# Patient Record
Sex: FEMALE | Race: WHITE | NOT HISPANIC OR LATINO | Employment: OTHER | URBAN - METROPOLITAN AREA
[De-identification: names, ages, dates, MRNs, and addresses within clinical notes are randomized per-mention and may not be internally consistent; named-entity substitution may affect disease eponyms.]

---

## 2017-01-13 ENCOUNTER — TELEPHONE (OUTPATIENT)
Dept: FAMILY MEDICINE | Facility: CLINIC | Age: 67
End: 2017-01-13

## 2017-01-13 NOTE — TELEPHONE ENCOUNTER
1/13/2017    Call Regarding Preventive Health Screening Colonoscopy and Mammogram    Attempt 1    Message on voicemail     Comments:       Outreach   cnt

## 2017-01-24 NOTE — TELEPHONE ENCOUNTER
1/24/2017    Call Regarding Preventive Health Screening Colonoscopy and Mammogram    Attempt 2    Message Patient is aware of overdue screening and will call on own time for scheduling      Comments:         Outreach   Vickie Lopez

## 2017-02-27 DIAGNOSIS — Z12.31 ENCOUNTER FOR SCREENING MAMMOGRAM FOR BREAST CANCER: Primary | ICD-10-CM

## 2017-11-08 ENCOUNTER — OFFICE VISIT (OUTPATIENT)
Dept: FAMILY MEDICINE | Facility: CLINIC | Age: 67
End: 2017-11-08
Payer: COMMERCIAL

## 2017-11-08 VITALS
DIASTOLIC BLOOD PRESSURE: 84 MMHG | WEIGHT: 177.8 LBS | SYSTOLIC BLOOD PRESSURE: 132 MMHG | HEART RATE: 66 BPM | BODY MASS INDEX: 27.85 KG/M2

## 2017-11-08 DIAGNOSIS — E11.9 TYPE 2 DIABETES MELLITUS WITHOUT COMPLICATION, WITHOUT LONG-TERM CURRENT USE OF INSULIN (H): Primary | ICD-10-CM

## 2017-11-08 DIAGNOSIS — Z11.59 NEED FOR HEPATITIS C SCREENING TEST: ICD-10-CM

## 2017-11-08 DIAGNOSIS — R03.0 ELEVATED BLOOD PRESSURE READING WITHOUT DIAGNOSIS OF HYPERTENSION: ICD-10-CM

## 2017-11-08 LAB — HBA1C MFR BLD: 7.2 % (ref 4.3–6)

## 2017-11-08 PROCEDURE — 36415 COLL VENOUS BLD VENIPUNCTURE: CPT | Performed by: FAMILY MEDICINE

## 2017-11-08 PROCEDURE — 82043 UR ALBUMIN QUANTITATIVE: CPT | Performed by: FAMILY MEDICINE

## 2017-11-08 PROCEDURE — 80048 BASIC METABOLIC PNL TOTAL CA: CPT | Performed by: FAMILY MEDICINE

## 2017-11-08 PROCEDURE — 99207 C FOOT EXAM  NO CHARGE: CPT | Performed by: FAMILY MEDICINE

## 2017-11-08 PROCEDURE — 86803 HEPATITIS C AB TEST: CPT | Performed by: FAMILY MEDICINE

## 2017-11-08 PROCEDURE — 84443 ASSAY THYROID STIM HORMONE: CPT | Performed by: FAMILY MEDICINE

## 2017-11-08 PROCEDURE — 80061 LIPID PANEL: CPT | Performed by: FAMILY MEDICINE

## 2017-11-08 PROCEDURE — 99214 OFFICE O/P EST MOD 30 MIN: CPT | Performed by: FAMILY MEDICINE

## 2017-11-08 PROCEDURE — 83036 HEMOGLOBIN GLYCOSYLATED A1C: CPT | Performed by: FAMILY MEDICINE

## 2017-11-08 RX ORDER — LANCETS 26 GAUGE
1 EACH MISCELLANEOUS 3 TIMES DAILY
Qty: 300 EACH | Refills: 1 | Status: SHIPPED | OUTPATIENT
Start: 2017-11-08 | End: 2018-12-07

## 2017-11-08 RX ORDER — METFORMIN HCL 500 MG
500 TABLET, EXTENDED RELEASE 24 HR ORAL
Qty: 270 TABLET | Refills: 3 | Status: SHIPPED | OUTPATIENT
Start: 2017-11-08 | End: 2018-12-07

## 2017-11-08 NOTE — PROGRESS NOTES
SUBJECTIVE:   Sigrid Mcneill is a 67 year old female who presents to clinic today for the following health issues:    Chief Complaint   Patient presents with     Diabetes     recheck and medication refills      Diabetes Follow-up    Patient is checking blood sugars: rarely.  Results range from 100 to 170    Diabetic concerns: None     Symptoms of hypoglycemia (low blood sugar): none     Paresthesias (numbness or burning in feet) or sores: No     Date of last diabetic eye exam: never    Hyperlipidemia Follow-Up    Rate your low fat/cholesterol diet?: fair    Taking statin?  No    Other lipid medications/supplements?:  none    Hypertension Follow-up    Outpatient blood pressures are not being checked.    Low Salt Diet: low salt    Amount of exercise or physical activity: 4-5 days/week for an average of 15-30 minutes    Problems taking medications regularly: No    Medication side effects: none    Diet: regular (no restrictions)    ADDITIONAL HPI: 67 year old female here for above issue.     ROS: 10 point review of systems negative except as per HPI.    PAST MEDICAL HISTORY:  Past Medical History:   Diagnosis Date     Hypertension     resolved with weight loss        ACTIVE MEDICAL PROBLEMS:  Patient Active Problem List   Diagnosis     Esophageal reflux     CARDIOVASCULAR SCREENING; LDL GOAL LESS THAN 160     Advanced directives, counseling/discussion     Type 2 diabetes mellitus without complication (H)        FAMILY HISTORY:  Family History   Problem Relation Age of Onset     Hypertension Mother      Cardiovascular Mother      CHF     DIABETES Mother      Lung Cancer Father      smoker     CANCER Daughter      brain tumor     Thyroid Disease No family hx of        SOCIAL HISTORY:  Social History     Social History     Marital status:      Spouse name: N/A     Number of children: N/A     Years of education: N/A     Occupational History     Not on file.     Social History Main Topics     Smoking status:  Former Smoker     Smokeless tobacco: Never Used      Comment: quit 1978     Alcohol use No     Drug use: No     Sexual activity: Not Currently     Other Topics Concern     Parent/Sibling W/ Cabg, Mi Or Angioplasty Before 65f 55m? No     Social History Narrative       MEDICATIONS:  Current Outpatient Prescriptions   Medication Sig Dispense Refill     metFORMIN (GLUCOPHAGE-XR) 500 MG 24 hr tablet Take 1 tablet (500 mg) by mouth 3 times daily (before meals) 270 tablet 3     blood glucose monitoring (NO BRAND SPECIFIED) test strip Use to test blood sugars 2-3 times daily or as directed. ReliOn Prime Glucose test strips, what ever pt wants/Insurance will cover. 200 each 5     Blood Glucose Monitoring Suppl (RELION CONFIRM GLUCOSE MONITOR) W/DEVICE KIT 1 Device 3 times daily (before meals) 1 kit 1     RELION LANCETS THIN 26G MISC 1 each 3 times daily 300 each 1     Lactobacillus (ACIDOPHILUS PROBIOTIC) 100 MG CAPS Take 1 capsule by mouth daily       CRANBERRY 2 tablets daily as needed         omeprazole (PRILOSEC) 20 MG capsule Take 1 capsule by mouth daily. 90 capsule 3     CLARITIN 10 MG OR TABS 1 TABLET DAILY prn       STATIN NOT PRESCRIBED, INTENTIONAL, Statin not prescribed intentionally due to Other **We discussed the recommendation that diabetics be started on statins regardless of lipid levels, but Sigrid would prefer not to do that as long as she can keep her sugars well controlled (current A1C now 6.4), and in view of her baseline lipids (, HDL 45, Tg 116, ) I think that is reasonable.* (This option does not exclude patient from measure) (Patient not taking: Reported on 11/8/2017) 0 each 0     estradiol (VAGIFEM) 10 MCG TABS Insert one nightly in vagina for two weeks, then one twice weekly (Patient not taking: Reported on 11/8/2017) 24 tablet prn     Phenazopyridine HCl (AZO TABS PO) Take  by mouth. 2 tabs as needed         ALLERGIES:   No Known Allergies    Problem list, Medication list, Allergies,  and Medical/Social/Surgical histories reviewed in [facility] and updated as appropriate.    OBJECTIVE:                                                    VITALS: /90 (BP Location: Right arm, Patient Position: Chair, Cuff Size: Adult Regular)  Pulse 66  Wt 177 lb 12.8 oz (80.6 kg)  BMI 27.85 kg/m2 Body mass index is 27.85 kg/(m^2).  GENERAL: Pleasant, well appearing female.  HEENT: PERRL, EOMI, oropharynx clear.  NECK: supple, no thyromegaly or thyroid masses, no lymphadenopathy.  CV: RRR, no murmurs, rubs or gallops. No carotid bruits.   LUNGS: Clear to auscultation bilaterally, normal effort.  ABDOMEN: Soft, non-distended, non-tender.  No hepatosplenomegaly or palpable masses.    SKIN: warm and dry without obvious rashes.   EXTREMITIES: No edema, normal pulses. Diabetic foot exam: normal DP and PT pulses, no trophic changes or ulcerative lesions and normal sensory exam       Lab Results   Component Value Date    A1C 7.2 11/08/2017    A1C 6.4 06/13/2016    A1C 13.4 02/02/2016          ASSESSMENT/PLAN:                                                    1. Type 2 diabetes mellitus without complication, without long-term current use of insulin (H)  Overall has done a good job controlling blood sugars. She readily admits that she often forgets multiple doses of metformin per day. She's taking it 3 times a day primarily because of upset stomach. Advised her to try maybe streamlining it to 500 with breakfast and 1000 with lunch or dinner. Discussed that really given the fact that formulation she can dose it once a day as long as she tolerates it from a GI standpoint. She will try this. Hopefully with fewer missed doses she'll have improved sugar control. She has a very high deductible for visits. She is hoping that is 6 months from now she will do a lab only diabetes check and only follow up if there is issues. Future labs pended. If insurance changes and deductible is less advised an office visit in 6 months otherwise  "we'll see her in a year.  - TSH with free T4 reflex  - Hemoglobin A1c  - Basic metabolic panel  (Ca, Cl, CO2, Creat, Gluc, K, Na, BUN)  - Lipid panel reflex to direct LDL Fasting  - metFORMIN (GLUCOPHAGE-XR) 500 MG 24 hr tablet; Take 1 tablet (500 mg) by mouth 3 times daily (before meals)  Dispense: 270 tablet; Refill: 3  - blood glucose monitoring (NO BRAND SPECIFIED) test strip; Use to test blood sugars 2-3 times daily or as directed. ReliOn Prime Glucose test strips, what ever pt wants/Insurance will cover.  Dispense: 200 each; Refill: 5  - RELION LANCETS THIN 26G MISC; 1 each 3 times daily  Dispense: 300 each; Refill: 1  - aspirin 81 MG EC tablet; Take 1 tablet (81 mg) by mouth daily  Dispense: 90 tablet; Refill: 3  - Albumin Random Urine Quantitative with Creat Ratio  - FOOT EXAM    2. Need for hepatitis C screening test  - Hepatitis C Screen Reflex to HCV RNA Quant and Genotype    3. Elevated blood pressure reading without diagnosis of hypertension  Did come down on recheck. We'll keep an eye on this. Advised monitoring outpatient blood pressures. Discussed starting an ACE inhibitor. Recommended that that is standard of care for diabetes regardless of whether or not there is hypertension. She is disinclined to start that today. \"I don't want to take anything I don't absolutely have to\" it appears that she has not ever had urine microalbumin so we'll check that. If elevated would more strongly recommend ACE inhibitor. 6      Also discussed health maintenance. She is due for mammogram, colon cancer screening, DEXA scan, pneumococcal vaccine, eye exam, hep C screening. She is agreeable to having hep C tested. She does not want to do mammogram, colon cancer screening, bone density screening, pneumonia vaccine. We discussed clinical recommendations regarding this. I strongly encouraged her to consider getting the screening tests. Discussed rationale. Discussed when any women get breast cancer and the outcomes are " improved with early detection via mammography. Discussed frequency of colon cancer diagnosis and polyp removal and colonoscopy is essentially curative of colon cancer. Discussed fit testing as an alternative to colonoscopy for colon cancer screening. Recommended annual eye exam to screen for diabetic retinopathy. Discussed risk of blindness if not detected early. Discussed screening DEXA scan. CBC information provided on pneumococcal vaccine.

## 2017-11-08 NOTE — LETTER
[facility]  [address]  [facility] [address]  [phone]        August 16, 2018    Sigrid Mcneill  [address] [facility] [address]              Dear Sigrid Mcneill    This is to remind you that your A1C is due.    You may call our office at [phone] to schedule an appointment.    Please disregard this notice if you have already had your labs drawn or made an appointment.        Sincerely,        Idalia Gage MD

## 2017-11-08 NOTE — LETTER
[facility]  [address] [facility] [address]  Phone: [phone]      11/22/2017     Sigrid Mcneill  [address] [place] [address]      Dear Sigrid:    Thank you for allowing me to participate in your care. Your recent test results were reviewed and listed below.  Other than mildly increased A1C - labs are stable.     Your results are provided below for your review  Results for orders placed or performed in visit on 11/08/17   TSH with free T4 reflex   Result Value Ref Range    TSH 1.65 0.40 - 4.00 mU/L   Hemoglobin A1c   Result Value Ref Range    Hemoglobin A1C 7.2 (H) 4.3 - 6.0 %   Basic metabolic panel  (Ca, Cl, CO2, Creat, Gluc, K, Na, BUN)   Result Value Ref Range    Sodium 142 133 - 144 mmol/L    Potassium 3.8 3.4 - 5.3 mmol/L    Chloride 109 94 - 109 mmol/L    Carbon Dioxide 26 20 - 32 mmol/L    Anion Gap 7 3 - 14 mmol/L    Glucose 127 (H) 70 - 99 mg/dL    Urea Nitrogen 11 7 - 30 mg/dL    Creatinine 0.96 0.52 - 1.04 mg/dL    GFR Estimate 58 (L) >60 mL/min/1.7m2    GFR Estimate If Black 70 >60 mL/min/1.7m2    Calcium 9.3 8.5 - 10.1 mg/dL   Lipid panel reflex to direct LDL Fasting   Result Value Ref Range    Cholesterol 129 <200 mg/dL    Triglycerides 117 <150 mg/dL    HDL Cholesterol 37 (L) >49 mg/dL    LDL Cholesterol Calculated 69 <100 mg/dL    Non HDL Cholesterol 92 <130 mg/dL   Albumin Random Urine Quantitative with Creat Ratio   Result Value Ref Range    Creatinine Urine 106 mg/dL    Albumin Urine mg/L 17 mg/L    Albumin Urine mg/g Cr 16.32 0 - 25 mg/g Cr   Hepatitis C Screen Reflex to HCV RNA Quant and Genotype   Result Value Ref Range    Hepatitis C Antibody Nonreactive NR^Nonreactive                 Thank you for choosing [facility]. As a result, please continue with the treatment plan discussed in the office. Return as discussed or sooner if symptoms worsen or fail to improve. If you have any further questions or concerns, please do not hesitate to contact  us.      Sincerely,        Dr. Idalia Gage

## 2017-11-08 NOTE — NURSING NOTE
"Chief Complaint   Patient presents with     Diabetes     recheck and medication refills        Initial /90 (BP Location: Right arm, Patient Position: Chair, Cuff Size: Adult Regular)  Pulse 66  Wt 177 lb 12.8 oz (80.6 kg)  BMI 27.85 kg/m2 Estimated body mass index is 27.85 kg/(m^2) as calculated from the following:    Height as of 2/4/16: 5' 7\" (1.702 m).    Weight as of this encounter: 177 lb 12.8 oz (80.6 kg).  Medication Reconciliation: complete   Vanessa Yanez CMA    "

## 2017-11-08 NOTE — MR AVS SNAPSHOT
"              After Visit Summary   11/8/2017    Sigrid Mcneill    MRN: [id number]           Patient Information     Date Of Birth          1950        Visit Information        Provider Department      11/8/2017 3:00 PM Idalia Gage MD [facility]        Today's Diagnoses     Type 2 diabetes mellitus without complication, without long-term current use of insulin (H)    -  1    Elevated blood pressure reading without diagnosis of hypertension        Need for hepatitis C screening test           Follow-ups after your visit        Future tests that were ordered for you today     Open Future Orders        Priority Expected Expires Ordered    Hemoglobin A1c Routine 5/11/2018 8/10/2018 11/8/2017            Who to contact     If you have questions or need follow up information about today's clinic visit or your schedule please contact [facility] directly at [phone].  Normal or non-critical lab and imaging results will be communicated to you by [employer], letter or phone within 4 business days after the clinic has received the results. If you do not hear from us within 7 days, please contact the clinic through [employer] or phone. If you have a critical or abnormal lab result, we will notify you by phone as soon as possible.  Submit refill requests through [employer] or call your pharmacy and they will forward the refill request to us. Please allow 3 business days for your refill to be completed.          Additional Information About Your Visit        MyChart Information     [employer] lets you send messages to your doctor, view your test results, renew your prescriptions, schedule appointments and more. To sign up, go to [email or web address] . Click on \"Log in\" on the left side of the screen, which will take you to the Welcome page. Then click on \"Sign up Now\" on the right side of the page.     You will be asked to enter the access code listed below, as well as some " personal information. Please follow the directions to create your username and password.     Your access code is: [email or web address]  Expires: 2018  4:39 PM     Your access code will  in 90 days. If you need help or a new code, please call your [facility] clinic or [phone].        Care EveryWhere ID     This is your Care EveryWhere ID. This could be used by other organizations to access your [facility] medical records  [id number]        Your Vitals Were     Pulse BMI (Body Mass Index)                66 27.85 kg/m2           Blood Pressure from Last 3 Encounters:   17 132/84   16 124/84   16 (!) 129/92    Weight from Last 3 Encounters:   17 177 lb 12.8 oz (80.6 kg)   16 167 lb (75.8 kg)   16 167 lb (75.8 kg)              We Performed the Following     Albumin Random Urine Quantitative with Creat Ratio     Basic metabolic panel  (Ca, Cl, CO2, Creat, Gluc, K, Na, BUN)     FOOT EXAM     Hemoglobin A1c     Hepatitis C Screen Reflex to HCV RNA Quant and Genotype     Lipid panel reflex to direct LDL Fasting     TSH with free T4 reflex          Today's Medication Changes          These changes are accurate as of: 17  4:39 PM.  If you have any questions, ask your nurse or doctor.               Start taking these medicines.        Dose/Directions    blood glucose monitoring test strip   Commonly known as:  no brand specified   Used for:  Type 2 diabetes mellitus without complication, without long-term current use of insulin (H)   Started by:  Idalia Gage MD        Use to test blood sugars 2-3 times daily or as directed. ReliOn Prime Glucose test strips, what ever pt wants/Insurance will cover.   Quantity:  200 each   Refills:  5         Stop taking these medicines if you haven't already. Please contact your care team if you have questions.     estradiol 10 MCG Tabs vaginal tablet   Commonly known as:  VAGIFEM   Stopped by:  Idalia Gage MD                 Where to get your medicines      These medications [place] [facility] [place] - [address] [facility]  [address] [facility] [place], [facility] [address]     Phone:  [phone]     metFORMIN 500 MG 24 hr tablet         These medications were sent to [facility] - [place], [place] - [address]  [address], [place] [address]     Phone:  [phone]     RELION LANCETS THIN 26G Misc         These medications were sent to [facility] [address] [place], [place] - [address]  [address], [facility] [address]     Phone:  [phone]     blood glucose monitoring test strip                Primary Care Provider Office Phone # Fax #    Devorah Hannah Prince [phone] [address]  [facility] [address]        Equal Access to Services     [facility] : Hadii aad ku hadasho Soomaali, waaxda luqadaha, qaybta kaalmada adeegyada, waxay idiin hayaan adeeg kharash la'manuel . So [facility] [phone].    ATENCIÓN: Si habla español, tiene a fermin disposición servicios gratuitos de asistencia lingüística. Ll[facility] [phone].    We comply with applicable federal civil rights laws and [place] laws. We do not discriminate on the basis of race, color, national origin, age, disability, sex, sexual orientation, or gender identity.            Thank you!     Thank you for choosing [facility]  for your care. Our goal is always to provide you with excellent care. Hearing back from our patients is one way we can continue to improve our services. Please take a few minutes to complete the written survey that you may receive in the mail after your visit with us. Thank you!             Your Updated Medication List - Protect others around you: Learn how to safely use, store and throw away your medicines at www.disposemymeds.org.          This list is accurate as of: 11/8/17  4:39 PM.  Always use your most  recent med list.                   Brand Name Dispense Instructions for use Diagnosis    ACIDOPHILUS PROBIOTIC 100 MG Caps      Take 1 capsule by mouth daily        aspirin 81 MG EC tablet     90 tablet    Take 1 tablet (81 mg) by mouth daily    Type 2 diabetes mellitus without complication, without long-term current use of insulin (H)       AZO TABS PO      Take  by mouth. 2 tabs as needed        blood glucose monitoring test strip    no brand specified    200 each    Use to test blood sugars 2-3 times daily or as directed. ReliOn Prime Glucose test strips, what ever pt wants/Insurance will cover.    Type 2 diabetes mellitus without complication, without long-term current use of insulin (H)       CLARITIN 10 MG tablet   Generic drug:  loratadine      1 TABLET DAILY prn        CRANBERRY      2 tablets daily as needed        metFORMIN 500 MG 24 hr tablet    GLUCOPHAGE-XR    270 tablet    Take 1 tablet (500 mg) by mouth 3 times daily (before meals)    Type 2 diabetes mellitus without complication, without long-term current use of insulin (H)       omeprazole 20 MG CR capsule    priLOSEC    90 capsule    Take 1 capsule by mouth daily.    Esophageal reflux       RELION CONFIRM GLUCOSE MONITOR W/DEVICE Kit     1 kit    1 Device 3 times daily (before meals)    Type 2 diabetes mellitus, uncontrolled (H)       RELION LANCETS THIN 26G Misc     300 each    1 each 3 times daily    Type 2 diabetes mellitus without complication, without long-term current use of insulin (H)       STATIN NOT PRESCRIBED (INTENTIONAL)     0 each    Statin not prescribed intentionally due to Other **We discussed the recommendation that diabetics be started on statins regardless of lipid levels, but Sigrid would prefer not to do that as long as she can keep her sugars well controlled (current A1C now 6.4), and in view of her baseline lipids (, HDL 45, Tg 116, ) I think that is reasonable.* (This option does not exclude patient from  measure)    Type 2 diabetes mellitus without complication (H)

## 2017-11-09 LAB
ANION GAP SERPL CALCULATED.3IONS-SCNC: 7 MMOL/L (ref 3–14)
BUN SERPL-MCNC: 11 MG/DL (ref 7–30)
CALCIUM SERPL-MCNC: 9.3 MG/DL (ref 8.5–10.1)
CHLORIDE SERPL-SCNC: 109 MMOL/L (ref 94–109)
CHOLEST SERPL-MCNC: 129 MG/DL
CO2 SERPL-SCNC: 26 MMOL/L (ref 20–32)
CREAT SERPL-MCNC: 0.96 MG/DL (ref 0.52–1.04)
CREAT UR-MCNC: 106 MG/DL
GFR SERPL CREATININE-BSD FRML MDRD: 58 ML/MIN/1.7M2
GLUCOSE SERPL-MCNC: 127 MG/DL (ref 70–99)
HCV AB SERPL QL IA: NONREACTIVE
HDLC SERPL-MCNC: 37 MG/DL
LDLC SERPL CALC-MCNC: 69 MG/DL
MICROALBUMIN UR-MCNC: 17 MG/L
MICROALBUMIN/CREAT UR: 16.32 MG/G CR (ref 0–25)
NONHDLC SERPL-MCNC: 92 MG/DL
POTASSIUM SERPL-SCNC: 3.8 MMOL/L (ref 3.4–5.3)
SODIUM SERPL-SCNC: 142 MMOL/L (ref 133–144)
TRIGL SERPL-MCNC: 117 MG/DL
TSH SERPL DL<=0.005 MIU/L-ACNC: 1.65 MU/L (ref 0.4–4)

## 2017-12-20 ENCOUNTER — TELEPHONE (OUTPATIENT)
Dept: FAMILY MEDICINE | Facility: CLINIC | Age: 67
End: 2017-12-20

## 2017-12-20 NOTE — LETTER
[facility]  [address]  [facility] [address]  Phone: [phone]    December 20, 2017      Sigrid Mcneill  [address] [place] [place] [address]      Dear Sigrid:    As part of [facility]'s commitment to health and wellness, we inform our patient when records indicate the need for specific health screening.  Please review the following health screening recommendations based on your age:    This is a reminder that it is time to make your appointment for your annual mammogram.  You may make an appointment for your Digital Mammogram by calling our scheduling line at [phone] to schedule at one of our locations. Your last mammogram we have on file was 3/30/05.    If you are experiencing breast symptoms or concerns such as a new lump or breast pain you should first contact your health care provider before scheduling your mammogram. Your physician may want to see you prior to your visit.    We would like to take this opportunity to remind you that along with an annual mammogram, the American Cancer Society recommends an annual breast examination by your physician or health care provider.    If you are a patient currently enrolled in the [place] JALEN Program or the [facility] Wellness Program, you must be seen by your provider for a clinical breast examination prior to your mammogram.    Please disregard this notice if you have already scheduled an appointment. Or, if you have had this done elsewhere, please have your records sent to the clinic.     Thank you and we look forward to seeing you in the near future for your annual screening mammogram.      Sincerely,     Idalia Gage MD/la

## 2017-12-20 NOTE — TELEPHONE ENCOUNTER
Panel Management Review      Patient has the following on her problem list:     Diabetes    ASA: Passed    Last A1C  Lab Results   Component Value Date    A1C 7.2 11/08/2017    A1C 6.4 06/13/2016    A1C 13.4 02/02/2016     A1C tested: MONITOR    Last LDL:    Lab Results   Component Value Date    CHOL 129 11/08/2017     Lab Results   Component Value Date    HDL 37 11/08/2017     Lab Results   Component Value Date    LDL 69 11/08/2017     Lab Results   Component Value Date    TRIG 117 11/08/2017     No results found for: CHOLHDLRATIO  Lab Results   Component Value Date    NHDL 92 11/08/2017       Is the patient on a Statin? NO             Is the patient on Aspirin? YES    Medications     HMG CoA Reductase Inhibitors    STATIN NOT PRESCRIBED, INTENTIONAL,    Salicylates    aspirin 81 MG EC tablet          Last three blood pressure readings:  BP Readings from Last 3 Encounters:   11/08/17 132/84   02/04/16 124/84   02/02/16 (!) 129/92       Date of last diabetes office visit: 11/8/17     Tobacco History:     History   Smoking Status     Former Smoker   Smokeless Tobacco     Never Used     Comment: quit 1978             Composite cancer screening  Chart review shows that this patient is due/due soon for the following Mammogram and Fecal Colorectal (FIT)  Summary:    Patient is due/failing the following:   FIT and MAMMOGRAM    Action needed:   Due to complete MAMMOGRAM and FIT    Type of outreach:    Sent letter.    Questions for provider review:    None                                                                                                                                    Janet Sullivan MA

## 2017-12-20 NOTE — LETTER
[facility]  [address]  [facility] [address]  Phone: [phone]    December 20, 2017      Sigrid Mcneill  [address] [place] [address]      Dear Sigrid:      To better serve you, we are sending this letter to notify you that we have attempted to contact you by telephone to schedule the following procedure(s) ordered by your physician.  In review of our electronic medical record, we believe that your colon cancer screening is not up to date. Routine screening is recommended for most patients every ten years between the ages of 50 and 80. Currently, most physicians recommend colonoscopy. If you have already had this procedure at another facility, we would like to have you get a copy of the report to us. If you would like more information about colonoscopy, and you have access to the internet, please visit the [facility] web site below.   If you are ready to schedule your colonoscopy, please call [phone] to schedule your appointment. The instructions for the prep are included in this letter.     [email or web address]                                                                     OR    We now offer a less invasive form of colon cancer screening that can be completed yearly. It is called a FIT test or Fecal Immunoassay Test. This test can be done at home and mailed in.    This is a test to check for blood in the stool. Studies have proven that this test, when performed yearly in people ages 50 to 75, reduces the number of deaths due to Colorectal cancer by as much as 30 %.    If this is a test you are willing to complete, to help you with your preventative self care, please contact your clinic for the appropriate kit. They will also provide you  with instructions and answer any other questions you may have.                  It is possible that you are seeing someone else for your primary health care.  If you have had  either of these tests done elsewhere, let us know where it was done, approximately when, and the results or have your records sent to the clinic. We will update your chart and this will eliminate sending you reminder letters and phone calls.    Sincerely,    Idalia Gage MD/la

## 2018-03-14 ENCOUNTER — TELEPHONE (OUTPATIENT)
Dept: FAMILY MEDICINE | Facility: CLINIC | Age: 68
End: 2018-03-14

## 2018-03-14 NOTE — TELEPHONE ENCOUNTER
The Patient declined Preventive Health Screens for: Mammogram  Please review chart and follow-up with patient if needed.    Thank you

## 2018-12-07 ENCOUNTER — OFFICE VISIT (OUTPATIENT)
Dept: FAMILY MEDICINE | Facility: CLINIC | Age: 68
End: 2018-12-07
Payer: COMMERCIAL

## 2018-12-07 VITALS
DIASTOLIC BLOOD PRESSURE: 82 MMHG | RESPIRATION RATE: 16 BRPM | WEIGHT: 178.4 LBS | OXYGEN SATURATION: 96 % | TEMPERATURE: 97 F | BODY MASS INDEX: 28 KG/M2 | HEART RATE: 79 BPM | SYSTOLIC BLOOD PRESSURE: 132 MMHG | HEIGHT: 67 IN

## 2018-12-07 DIAGNOSIS — E11.9 TYPE 2 DIABETES MELLITUS WITHOUT COMPLICATION, WITHOUT LONG-TERM CURRENT USE OF INSULIN (H): Primary | ICD-10-CM

## 2018-12-07 DIAGNOSIS — R74.01 NONSPECIFIC ELEVATION OF LEVELS OF TRANSAMINASE OR LACTIC ACID DEHYDROGENASE (LDH): ICD-10-CM

## 2018-12-07 DIAGNOSIS — R03.0 ELEVATED BLOOD PRESSURE READING WITHOUT DIAGNOSIS OF HYPERTENSION: ICD-10-CM

## 2018-12-07 DIAGNOSIS — R74.02 NONSPECIFIC ELEVATION OF LEVELS OF TRANSAMINASE OR LACTIC ACID DEHYDROGENASE (LDH): ICD-10-CM

## 2018-12-07 LAB
ALBUMIN SERPL-MCNC: 4.1 G/DL (ref 3.4–5)
ALP SERPL-CCNC: 118 U/L (ref 40–150)
ALT SERPL W P-5'-P-CCNC: 60 U/L (ref 0–50)
ANION GAP SERPL CALCULATED.3IONS-SCNC: 4 MMOL/L (ref 3–14)
AST SERPL W P-5'-P-CCNC: 58 U/L (ref 0–45)
BILIRUB SERPL-MCNC: 0.4 MG/DL (ref 0.2–1.3)
BUN SERPL-MCNC: 11 MG/DL (ref 7–30)
CALCIUM SERPL-MCNC: 8.5 MG/DL (ref 8.5–10.1)
CHLORIDE SERPL-SCNC: 106 MMOL/L (ref 94–109)
CHOLEST SERPL-MCNC: 153 MG/DL
CO2 SERPL-SCNC: 30 MMOL/L (ref 20–32)
CREAT SERPL-MCNC: 0.82 MG/DL (ref 0.52–1.04)
CREAT UR-MCNC: 80 MG/DL
GFR SERPL CREATININE-BSD FRML MDRD: 69 ML/MIN/1.7M2
GLUCOSE SERPL-MCNC: 101 MG/DL (ref 70–99)
HBA1C MFR BLD: 7.1 % (ref 0–5.6)
HDLC SERPL-MCNC: 37 MG/DL
LDLC SERPL CALC-MCNC: 90 MG/DL
MICROALBUMIN UR-MCNC: 19 MG/L
MICROALBUMIN/CREAT UR: 24.06 MG/G CR (ref 0–25)
NONHDLC SERPL-MCNC: 116 MG/DL
POTASSIUM SERPL-SCNC: 4.1 MMOL/L (ref 3.4–5.3)
PROT SERPL-MCNC: 7.2 G/DL (ref 6.8–8.8)
SODIUM SERPL-SCNC: 140 MMOL/L (ref 133–144)
TRIGL SERPL-MCNC: 130 MG/DL
TSH SERPL DL<=0.005 MIU/L-ACNC: 2.54 MU/L (ref 0.4–4)

## 2018-12-07 PROCEDURE — 99214 OFFICE O/P EST MOD 30 MIN: CPT | Performed by: FAMILY MEDICINE

## 2018-12-07 PROCEDURE — 80061 LIPID PANEL: CPT | Performed by: FAMILY MEDICINE

## 2018-12-07 PROCEDURE — 82043 UR ALBUMIN QUANTITATIVE: CPT | Performed by: FAMILY MEDICINE

## 2018-12-07 PROCEDURE — 83036 HEMOGLOBIN GLYCOSYLATED A1C: CPT | Performed by: FAMILY MEDICINE

## 2018-12-07 PROCEDURE — 80053 COMPREHEN METABOLIC PANEL: CPT | Performed by: FAMILY MEDICINE

## 2018-12-07 PROCEDURE — 36415 COLL VENOUS BLD VENIPUNCTURE: CPT | Performed by: FAMILY MEDICINE

## 2018-12-07 PROCEDURE — 84443 ASSAY THYROID STIM HORMONE: CPT | Performed by: FAMILY MEDICINE

## 2018-12-07 RX ORDER — LANCETS 26 GAUGE
1 EACH MISCELLANEOUS 3 TIMES DAILY
Qty: 300 EACH | Refills: 1 | Status: SHIPPED | OUTPATIENT
Start: 2018-12-07 | End: 2024-09-13

## 2018-12-07 RX ORDER — METFORMIN HCL 500 MG
1500 TABLET, EXTENDED RELEASE 24 HR ORAL
Qty: 270 TABLET | Refills: 3 | Status: SHIPPED | OUTPATIENT
Start: 2018-12-07 | End: 2020-01-10

## 2018-12-07 RX ORDER — LANCETS 26 GAUGE
1 EACH MISCELLANEOUS 3 TIMES DAILY
Qty: 300 EACH | Refills: 1 | Status: SHIPPED | OUTPATIENT
Start: 2018-12-07 | End: 2018-12-07

## 2018-12-07 NOTE — PROGRESS NOTES
SUBJECTIVE:   Sigrid Mcneill is a 68 year old female who presents to clinic today for the following health issues:      Diabetes Follow-up      Patient is checking blood sugars: 1 times a month    Diabetic concerns: None     Symptoms of hypoglycemia (low blood sugar): none     Paresthesias (numbness or burning in feet) or sores: No     Date of last diabetic eye exam: 2001    BP Readings from Last 2 Encounters:   12/07/18 132/82   11/08/17 132/84     Hemoglobin A1C (%)   Date Value   11/08/2017 7.2 (H)   06/13/2016 6.4 (H)     LDL Cholesterol Calculated (mg/dL)   Date Value   11/08/2017 69   02/02/2016 103 (H)       Diabetes Management Resources    Amount of exercise or physical activity: 6-7 days/week for an average of greater than 60 minutes    Problems taking medications regularly: No    Medication side effects: none    Diet: regular (no restrictions)    ADDITIONAL HPI: 68 year old female here for above issue.      ROS: 10 point review of systems negative except as per HPI.    PAST MEDICAL HISTORY:  Past Medical History:   Diagnosis Date     Hypertension     resolved with weight loss        ACTIVE MEDICAL PROBLEMS:  Patient Active Problem List   Diagnosis     Esophageal reflux     CARDIOVASCULAR SCREENING; LDL GOAL LESS THAN 160     Advanced directives, counseling/discussion     Type 2 diabetes mellitus without complication (H)     Elevated blood pressure reading without diagnosis of hypertension        FAMILY HISTORY:  Family History   Problem Relation Age of Onset     Hypertension Mother      Cardiovascular Mother      CHF     Diabetes Mother      Lung Cancer Father      smoker     Cancer Daughter      brain tumor     Thyroid Disease No family hx of        SOCIAL HISTORY:  Social History     Social History     Marital status:      Spouse name: N/A     Number of children: N/A     Years of education: N/A     Occupational History     Not on file.     Social History Main Topics     Smoking status:  Former Smoker     Smokeless tobacco: Never Used      Comment: quit 1978     Alcohol use No     Drug use: No     Sexual activity: Not Currently     Other Topics Concern     Parent/Sibling W/ Cabg, Mi Or Angioplasty Before 65f 55m? No     Social History Narrative       MEDICATIONS:  Current Outpatient Prescriptions   Medication Sig Dispense Refill     CLARITIN 10 MG OR TABS 1 TABLET DAILY prn       metFORMIN (GLUCOPHAGE-XR) 500 MG 24 hr tablet Take 1 tablet (500 mg) by mouth 3 times daily (before meals) 270 tablet 3     omeprazole (PRILOSEC) 20 MG capsule Take 1 capsule by mouth daily. 90 capsule 3     aspirin 81 MG EC tablet Take 1 tablet (81 mg) by mouth daily 90 tablet 3     blood glucose monitoring (NO BRAND SPECIFIED) test strip Use to test blood sugars 2-3 times daily or as directed. ReliOn Prime Glucose test strips, what ever pt wants/Insurance will cover. 200 each 5     Blood Glucose Monitoring Suppl (RELION CONFIRM GLUCOSE MONITOR) W/DEVICE KIT 1 Device 3 times daily (before meals) 1 kit 1     CRANBERRY 2 tablets daily as needed         Lactobacillus (ACIDOPHILUS PROBIOTIC) 100 MG CAPS Take 1 capsule by mouth daily       Phenazopyridine HCl (AZO TABS PO) Take  by mouth. 2 tabs as needed       RELION LANCETS THIN 26G MISC 1 each 3 times daily 300 each 1     STATIN NOT PRESCRIBED, INTENTIONAL, Statin not prescribed intentionally due to Other **We discussed the recommendation that diabetics be started on statins regardless of lipid levels, but Sigrid would prefer not to do that as long as she can keep her sugars well controlled (current A1C now 6.4), and in view of her baseline lipids (, HDL 45, Tg 116, ) I think that is reasonable.* (This option does not exclude patient from measure) (Patient not taking: Reported on 11/8/2017) 0 each 0       ALLERGIES:   No Known Allergies    Problem list, Medication list, Allergies, and Medical/Social/Surgical histories reviewed in EPIC and updated as  "appropriate.    OBJECTIVE:                                                    VITALS: /82 (Cuff Size: Adult Regular)  Pulse 79  Temp 97  F (36.1  C) (Tympanic)  Resp 16  Ht 5' 7\" (1.702 m)  Wt 178 lb 6.4 oz (80.9 kg)  SpO2 96%  BMI 27.94 kg/m2 Body mass index is 27.94 kg/(m^2).  GENERAL: Pleasant, well appearing female.  HEENT: PERRL, EOMI, oropharynx clear.  NECK: supple, no thyromegaly or thyroid masses, no lymphadenopathy.  CV: RRR, no murmurs, rubs or gallops.  LUNGS: Clear to auscultation bilaterally, normal effort.  ABDOMEN: Soft, non-distended, non-tender.  No hepatosplenomegaly or palpable masses.    SKIN: warm and dry without obvious rashes.   EXTREMITIES: No edema, normal pulses.     Lab Results   Component Value Date    A1C 7.2 11/08/2017    A1C 6.4 06/13/2016    A1C 13.4 02/02/2016          ASSESSMENT/PLAN:                                                    1. Type 2 diabetes mellitus without complication, without long-term current use of insulin (H)  Well controlled. Refilled medication. Check labs.    - Hemoglobin A1c  - Lipid panel reflex to direct LDL Fasting  - Comprehensive metabolic panel  - TSH with free T4 reflex  - Albumin Random Urine Quantitative with Creat Ratio  - metFORMIN (GLUCOPHAGE-XR) 500 MG 24 hr tablet; Take 3 tablets (1,500 mg) by mouth daily (with dinner)  Dispense: 270 tablet; Refill: 3  - blood glucose monitoring (NO BRAND SPECIFIED) test strip; Use to test blood sugars 2-3 times daily or as directed. ReliOn Prime Glucose test strips, what ever pt wants/Insurance will cover.  Dispense: 200 each; Refill: 5  - RELION LANCETS THIN 26G MISC; 1 each 3 times daily  Dispense: 300 each; Refill: 1    2. Elevated blood pressure reading without diagnosis of hypertension  Continue to monitor. She is reluctant to start medications.         "

## 2018-12-07 NOTE — MR AVS SNAPSHOT
After Visit Summary   12/7/2018    Sigrid Mcneill    MRN: [id number]           Patient Information     Date Of Birth          1950        Visit Information        Provider Department      12/7/2018 2:00 PM Idalia Gage MD [facility]        Today's Diagnoses     Type 2 diabetes mellitus without complication, without long-term current use of insulin (H)    -  1    Elevated blood pressure reading without diagnosis of hypertension          Care Instructions          Thank you for choosing [facility].  You may be receiving a survey in the mail from [facility] regarding your visit today.  Please take a few minutes to complete and return the survey to let us know how we are doing.      Our Clinic hours are:  Mondays    7:20 am - 7 pm  Tues -  Fri  7:20 am - 5 pm    Clinic Phone: [phone]    The clinic lab opens at 7:30 am Mon - Fri and appointments are required.    [facility]  Ph. [phone]  Monday  8 am - 7pm  Tues - Fri 8 am - 5:30 pm                 Follow-ups after your visit        Who to contact     If you have questions or need follow up information about today's clinic visit or your schedule please contact [facility] directly at [phone].  Normal or non-critical lab and imaging results will be communicated to you by MyChart, letter or phone within 4 business days after the clinic has received the results. If you do not hear from us within 7 days, please contact the clinic through MyChart or phone. If you have a critical or abnormal lab result, we will notify you by phone as soon as possible.  Submit refill requests through [facility] or call your pharmacy and they will forward the refill request to us. Please allow 3 business days for your refill to be completed.          Additional Information About Your Visit        Care EveryWhere ID     This is your Care EveryWhere ID. This could be used by other  "organizations to access your [facility] medical records  [id number]        Your Vitals Were     Pulse Temperature Respirations Height Pulse Oximetry BMI (Body Mass Index)    79 97  F (36.1  C) (Tympanic) 16 5' 7\" (1.702 m) 96% 27.94 kg/m2       Blood Pressure from Last 3 Encounters:   12/07/18 132/82   11/08/17 132/84   02/04/16 124/84    Weight from Last 3 Encounters:   12/07/18 178 lb 6.4 oz (80.9 kg)   11/08/17 177 lb 12.8 oz (80.6 kg)   02/04/16 167 lb (75.8 kg)              We Performed the Following     Albumin Random Urine Quantitative with Creat Ratio     Comprehensive metabolic panel     Hemoglobin A1c     Lipid panel reflex to direct LDL Fasting     TSH with free T4 reflex          Today's Medication Changes          These changes are accurate as of 12/7/18  2:52 PM.  If you have any questions, ask your nurse or doctor.               These medicines have changed or have updated prescriptions.        Dose/Directions    metFORMIN 500 MG 24 hr tablet   Commonly known as:  GLUCOPHAGE-XR   This may have changed:    - how much to take  - when to take this   Used for:  Type 2 diabetes mellitus without complication, without long-term current use of insulin (H)   Changed by:  Idalia Gage MD        Dose:  1500 mg   Take 3 tablets (1,500 mg) by mouth daily (with dinner)   Quantity:  270 tablet   Refills:  3            Where to get your medicines      These medications were [facility] [place] [place] [facility] [address] [facility]  [address] [facility] [address]     Phone:  [phone]     blood glucose monitoring test strip    metFORMIN 500 MG 24 hr tablet    RELION LANCETS THIN 26G [facility]                Primary Care Provider Office Phone # Fax #    Idalia Gage [phone] [address]  [facility] [address]        Equal Access to Services     JOSE ELIAS JULES AH: Hadii padmini kaur hadasho Soomaali, waaxda luqadaha, qaybta kaalmada " sujit loomisnohemy solisaacherelle ah. [facility] [phone].    ATENCIÓN: Si horaciola julee, tiene a fermin disposición servicios gratuitos de asistencia lingüística. Doe al [phone].    We comply with applicable federal civil rights laws and [place] laws. We do not discriminate on the basis of race, color, national origin, age, disability, sex, sexual orientation, or gender identity.            Thank you!     Thank you for choosing [facility]  for your care. Our goal is always to provide you with excellent care. Hearing back from our patients is one way we can continue to improve our services. Please take a few minutes to complete the written survey that you may receive in the mail after your visit with us. Thank you!             Your Updated Medication List - Protect others around you: Learn how to safely use, store and throw away your medicines at www.disposemymeds.org.          This list is accurate as of 12/7/18  2:52 PM.  Always use your most recent med list.                   Brand Name Dispense Instructions for use Diagnosis    ACIDOPHILUS PROBIOTIC 100 MG Caps      Take 1 capsule by mouth daily        aspirin 81 MG EC tablet    ASA    90 tablet    Take 1 tablet (81 mg) by mouth daily    Type 2 diabetes mellitus without complication, without long-term current use of insulin (H)       AZO TABS PO      Take  by mouth. 2 tabs as needed        blood glucose monitoring test strip    NO BRAND SPECIFIED    200 each    Use to test blood sugars 2-3 times daily or as directed. ReliOn Prime Glucose test strips, what ever pt wants/Insurance will cover.    Type 2 diabetes mellitus without complication, without long-term current use of insulin (H)       CLARITIN 10 MG tablet   Generic drug:  loratadine      1 TABLET DAILY prn        CRANBERRY      2 tablets daily as needed        metFORMIN 500 MG 24 hr tablet    GLUCOPHAGE-XR    270 tablet    Take 3 tablets (1,500 mg) by mouth daily  (with dinner)    Type 2 diabetes mellitus without complication, without long-term current use of insulin (H)       omeprazole 20 MG DR capsule    priLOSEC    90 capsule    Take 1 capsule by mouth daily.    Esophageal reflux       RELION CONFIRM GLUCOSE MONITOR w/Device Kit     1 kit    1 Device 3 times daily (before meals)    Type 2 diabetes mellitus, uncontrolled (H)       RELION LANCETS THIN 26G Misc     300 each    1 each 3 times daily    Type 2 diabetes mellitus without complication, without long-term current use of insulin (H)       STATIN NOT PRESCRIBED    INTENTIONAL    0 each    Statin not prescribed intentionally due to Other **We discussed the recommendation that diabetics be started on statins regardless of lipid levels, but Sigrid would prefer not to do that as long as she can keep her sugars well controlled (current A1C now 6.4), and in view of her baseline lipids (, HDL 45, Tg 116, ) I think that is reasonable.* (This option does not exclude patient from measure)    Type 2 diabetes mellitus without complication (H)

## 2018-12-07 NOTE — PATIENT INSTRUCTIONS
Thank you for choosing [facility].  You may be receiving a survey in the mail from [facility] regarding your visit today.  Please take a few minutes to complete and return the survey to let us know how we are doing.      Our Clinic hours are:  Mondays    7:20 am - 7 pm  Tues - Fri  7:20 am - 5 pm    Clinic Phone: [phone]    The clinic lab opens at 7:30 am Mon - Fri and appointments are required.    [facility]. [phone]  Monday  8 am - 7pm  Tues - Fri 8 am - 5:30 pm

## 2018-12-08 NOTE — PROGRESS NOTES
Please call the patient with the results. Notify that liver enzymes mildly elevated. In diabetics this may be a sign of fatty liver. I would recommend liver U/S for further evaluation and management. Order placed. If U/S normal then I'd plan to re-check labs again in 3 months to monitor this.

## 2018-12-12 ENCOUNTER — HOSPITAL ENCOUNTER (OUTPATIENT)
Dept: ULTRASOUND IMAGING | Facility: CLINIC | Age: 68
Discharge: HOME | End: 2018-12-12
Attending: FAMILY MEDICINE | Admitting: FAMILY MEDICINE
Payer: COMMERCIAL

## 2018-12-12 DIAGNOSIS — R74.01 NONSPECIFIC ELEVATION OF LEVELS OF TRANSAMINASE OR LACTIC ACID DEHYDROGENASE (LDH): ICD-10-CM

## 2018-12-12 DIAGNOSIS — R74.02 NONSPECIFIC ELEVATION OF LEVELS OF TRANSAMINASE OR LACTIC ACID DEHYDROGENASE (LDH): ICD-10-CM

## 2018-12-12 PROCEDURE — 76705 ECHO EXAM OF ABDOMEN: CPT

## 2018-12-12 NOTE — LETTER
[facility]  [address]  [place] [address]  Phone: [phone]      12/12/2018     Sigrid Mcneill  [address]  [place] [address]      Dear Sigrid:    Thank you for allowing me to participate in your care. Your recent test results were reviewed and listed below.  Ultrasound shows fatty liver, as expected. I am sending additional information regard this condition. The handout primarily discusses ACOSTA which is a more advanced for of NAFLD but all of the same preventive measures are helpful for NALFD. Limit or eliminate alcohol intake and reduce dietary carbohydrate intake.  I would plan to repeat labs annually and ultrasound every few years initially to monitor this.     Your results are provided below for your review  Results for orders placed or performed during the hospital encounter of 12/12/18   US Abdomen Limited    Narrative    RIGHT UPPER QUADRANT ULTRASOUND    PROCEDURE DATE:  12/12/2018 8:22 AM     CLINICAL HISTORY/INDICATION:   mildly elevated transaminases. ? NAFLD; mildly elevated transaminases.  ? NAFLD; Nonspecific elevation of levels of transaminase or lactic  acid dehydrogenase (LDH)    COMPARISON:   None.    TECHNIQUE:   Grayscale and color doppler were performed of the right upper  quadrant.    FINDINGS:   Gallbladder:  Normal with no cholelithiasis, wall thickening or focal  tenderness.      Bile ducts:   CHD is normal diameter.  No intrahepatic biliary  dilatation.    Liver:  Increased hepatic echogenicity with decreased penetration loss  of periportal fat signal. No focal hepatic mass    Pancreas:  Details partially obscured, visualized portions are  unremarkable.    Right kidney:  Measures 9.6 cm. Cortical thickness is 1.5 cm. Cortical echogenicity  is normal without evidence for shadowing stone or mass. No renal  cysts. No hydronephrosis.       Impression    IMPRESSION:   1.  Fatty infiltration of the liver.  2.  Otherwise unremarkable right upper  quadrant ultrasound.    JEAN PAUL TILLMAN MD                 Thank you for choosing [facility]. As a result, please continue with the treatment plan discussed in the office. Return as discussed or sooner if symptoms worsen or fail to improve.     If you have any further questions or concerns, please do not hesitate to contact us.      Sincerely,        Dr. Idalia Gage

## 2018-12-12 NOTE — RESULT ENCOUNTER NOTE
Please mail letter: Ultrasound shows fatty liver, as expected. I am sending additional information regard this condition. The handout primarily discusses ACOSTA which is a more advanced for of NAFLD but all of the same preventive measures are helpful for NALFD. Limit or eliminate alcohol intake and reduce dietary carbohydrate intake.  I would plan to repeat labs annually and ultrasound every few years initially to monitor this.

## 2019-03-07 DIAGNOSIS — E11.9 TYPE 2 DIABETES MELLITUS WITHOUT COMPLICATION, WITHOUT LONG-TERM CURRENT USE OF INSULIN (H): Primary | ICD-10-CM

## 2019-11-02 ENCOUNTER — HEALTH MAINTENANCE LETTER (OUTPATIENT)
Age: 69
End: 2019-11-02

## 2020-01-10 DIAGNOSIS — E11.9 TYPE 2 DIABETES MELLITUS WITHOUT COMPLICATION, WITHOUT LONG-TERM CURRENT USE OF INSULIN (H): ICD-10-CM

## 2020-01-10 RX ORDER — METFORMIN HCL 500 MG
1500 TABLET, EXTENDED RELEASE 24 HR ORAL
Qty: 90 TABLET | Refills: 0 | Status: SHIPPED | OUTPATIENT
Start: 2020-01-10 | End: 2020-05-04

## 2020-01-10 NOTE — TELEPHONE ENCOUNTER
Reason for Call:  Medication refill:    Do you use a [facility]?  Name of the pharmacy and phone number for the current request:  [facility], [address], [place] [address] # [phone] Fax# [phone]    Name of the medication requested: Metformin    Other request: Patient is recently retired, in [place], will not be back until May 2020. Will make appointment with Dr. Gage upon her return.    Can we leave a detailed message on this number? YES    Phone number patient can be reached at: Cell number on file:    Telephone Information:   Mobile [phone]       Best Time: Any   Last Written Prescription Date:  12/7/2018  Last Fill Quantity: 270,  # refills: 3   Last office visit: 12/7/2018 with prescribing provider:  Too  Future Office Visit:      Call taken on 1/10/2020 at 1:25 PM by Shandra Alicia

## 2020-01-10 NOTE — TELEPHONE ENCOUNTER
Patient called, she has not established care as she just moved to [place], but is made aware of 30 day refill, she will seek a provider in [place] to be evaluated, told to call back with any questions.    JELLY Pelaez

## 2020-01-10 NOTE — TELEPHONE ENCOUNTER
Dr. Gage,  Per note below, patient is in [place] till May, script is for 30 days, do you want to extend script till May or have patient be seen by provider in [place]?    JELLY Pelaez

## 2020-01-10 NOTE — TELEPHONE ENCOUNTER
Dr. Gage,  Please see note below, patient last seen on 12-7-18, labs not current, please advise refill till patient can return in May, thank you.    JELLY Pelaez

## 2020-01-10 NOTE — TELEPHONE ENCOUNTER
May will make it almost 1 1/2 years since she was last seen.  If she is in [place] for that long of a time period I would recommend she also have a primary there that she can see if need be.  Is she established with anyone there already that she could see for labs?

## 2020-02-10 ENCOUNTER — HEALTH MAINTENANCE LETTER (OUTPATIENT)
Age: 70
End: 2020-02-10

## 2020-02-24 DIAGNOSIS — E11.9 TYPE 2 DIABETES MELLITUS WITHOUT COMPLICATION, WITHOUT LONG-TERM CURRENT USE OF INSULIN (H): ICD-10-CM

## 2020-02-24 NOTE — TELEPHONE ENCOUNTER
"Requested Prescriptions   Pending Prescriptions Disp Refills     metFORMIN (GLUCOPHAGE-XR) 500 MG 24 hr tablet [Pharmacy Med Name: metFORMIN HCl  MG Oral Tablet Extended Release 24 Hour]  Last Written Prescription Date:  1/10/20  Last Fill Quantity: 90,  # refills: 0   Last Office Visit with [facility] or [facility] prescribing provider:  12/7/18   Future Office Visit:       0     Sig: TAKE 3 TABLETS BY MOUTH ONCE DAILY WITH SUPPER       Biguanide Agents Failed - 2/24/2020 10:57 AM        Failed - Blood pressure less than 140/90 in past 6 months     BP Readings from Last 3 Encounters:   12/07/18 132/82   11/08/17 132/84   02/04/16 124/84                 Failed - Patient has documented LDL within the past 12 mos.     Recent Labs   Lab Test 12/07/18  1451   LDL 90             Failed - Patient has documented A1c within the specified period of time.     If HgbA1C is 8 or greater, it needs to be on file within the past 3 months.  If less than 8, must be on file within the past 6 months.     Recent Labs   Lab Test 12/07/18  1451   A1C 7.1*             Failed - Patient's CR is NOT>1.4 OR Patient's EGFR is NOT<45 within past 12 mos.     Recent Labs   Lab Test 12/07/18  1451   GFRESTIMATED 69   GFRESTBLACK 83       Recent Labs   Lab Test 12/07/18  1451   CR 0.82             Failed - Recent (6 mo) or future (30 days) visit within the authorizing provider's specialty     Patient had office visit in the last 6 months or has a visit in the next 30 days with authorizing provider or within the authorizing provider's specialty.  See \"Patient Info\" tab in inbasket, or \"Choose Columns\" in Meds & Orders section of the refill encounter.            Passed - Patient has had a Microalbumin in the past 15 mos.     Recent Labs   Lab Test 12/07/18  1459   MICROL 19   UMALCR 24.06             Passed - Patient is age 10 or older        Passed - Patient does NOT have a diagnosis of CHF.        Passed - Medication is active on med list        " Passed - Patient is not pregnant        Passed - Patient has not had a positive pregnancy test within the past 12 mos.

## 2020-02-27 RX ORDER — METFORMIN HCL 500 MG
TABLET, EXTENDED RELEASE 24 HR ORAL
Refills: 0 | OUTPATIENT
Start: 2020-02-27

## 2020-02-27 NOTE — TELEPHONE ENCOUNTER
Patient moved to [place], no longer under care of Dr. Gage.  Was advised of 30 day [place] refill in January 2020 and needs to establish care in [place].    Sabina Webb RN

## 2020-04-30 DIAGNOSIS — E11.9 TYPE 2 DIABETES MELLITUS WITHOUT COMPLICATION, WITHOUT LONG-TERM CURRENT USE OF INSULIN (H): ICD-10-CM

## 2020-04-30 NOTE — TELEPHONE ENCOUNTER
Last Written Prescription Date:  Metformin 1/10/2020  Last Fill Quantity: 90,  # refills: 0   Last office visit: 12/7/2018 with prescribing provider:  MARTA Gage   Future Office Visit:    [facility]      Patient had Hgb A1c done late Feb in [place] it was 6.1  [facility]

## 2020-05-04 RX ORDER — METFORMIN HCL 500 MG
1500 TABLET, EXTENDED RELEASE 24 HR ORAL
Qty: 90 TABLET | Refills: 0 | Status: SHIPPED | OUTPATIENT
Start: 2020-05-04 | End: 2020-05-13

## 2020-05-13 ENCOUNTER — VIRTUAL VISIT (OUTPATIENT)
Dept: FAMILY MEDICINE | Facility: CLINIC | Age: 70
End: 2020-05-13
Payer: COMMERCIAL

## 2020-05-13 ENCOUNTER — TELEPHONE (OUTPATIENT)
Dept: FAMILY MEDICINE | Facility: CLINIC | Age: 70
End: 2020-05-13

## 2020-05-13 DIAGNOSIS — Z12.11 COLON CANCER SCREENING: Primary | ICD-10-CM

## 2020-05-13 DIAGNOSIS — K76.0 NAFLD (NONALCOHOLIC FATTY LIVER DISEASE): Primary | ICD-10-CM

## 2020-05-13 DIAGNOSIS — R03.0 ELEVATED BLOOD PRESSURE READING WITHOUT DIAGNOSIS OF HYPERTENSION: ICD-10-CM

## 2020-05-13 DIAGNOSIS — E11.9 TYPE 2 DIABETES MELLITUS WITHOUT COMPLICATION, WITHOUT LONG-TERM CURRENT USE OF INSULIN (H): ICD-10-CM

## 2020-05-13 PROCEDURE — 99214 OFFICE O/P EST MOD 30 MIN: CPT | Mod: 95 | Performed by: FAMILY MEDICINE

## 2020-05-13 RX ORDER — METFORMIN HCL 500 MG
1000 TABLET, EXTENDED RELEASE 24 HR ORAL
Qty: 180 TABLET | Refills: 3 | Status: SHIPPED | OUTPATIENT
Start: 2020-05-13 | End: 2021-03-09

## 2020-05-13 NOTE — PROGRESS NOTES
"Sigrid Mcneill is a 69 year old female who is being evaluated via a billable telephone visit.      The patient has been notified of following:     \"This telephone visit will be conducted via a call between you and your physician/provider. We have found that certain health care needs can be provided without the need for a physical exam.  This service lets us provide the care you need with a short phone conversation.  If a prescription is necessary we can send it directly to your pharmacy.  If lab work is needed we can place an order for that and you can then stop by our lab to have the test done at a later time.    Telephone visits are billed at different rates depending on your insurance coverage. During this emergency period, for some insurers they may be billed the same as an in-person visit.  Please reach out to your insurance provider with any questions.    If during the course of the call the physician/provider feels a telephone visit is not appropriate, you will not be charged for this service.\"    Patient has given verbal consent for Telephone visit?  Yes    What phone number would you like to be contacted at? [phone]    How would you like to obtain your AVS? Mail a copy    Subjective     Sigrid Mcneill is a 69 year old female who presents to clinic today for the following health issues:    HPI  Diabetes Follow-up    How often are you checking your blood sugar? A few times a week  What time of day are you checking your blood sugars (select all that apply)?  After meals  Have you had any blood sugars above 200?  No  Have you had any blood sugars below 70?  No    What symptoms do you notice when your blood sugar is low?  None    What concerns do you have today about your diabetes? None     Do you have any of these symptoms? (Select all that apply)  No numbness or tingling in feet.  No redness, sores or blisters on feet.  No complaints of excessive thirst.  No reports of blurry vision.  No " significant changes to weight.    Have you had a diabetic eye exam in the last 12 months? No        BP Readings from Last 2 Encounters:   12/07/18 132/82   11/08/17 132/84     Hemoglobin A1C (%)   Date Value   12/07/2018 7.1 (H)   11/08/2017 7.2 (H)     LDL Cholesterol Calculated (mg/dL)   Date Value   12/07/2018 90   11/08/2017 69         Hypertension Follow-up      Do you check your blood pressure regularly outside of the clinic? No     Are you following a low salt diet? Yes    Are your blood pressures ever more than 140 on the top number (systolic) OR more   than 90 on the bottom number (diastolic), for example 140/90? No      How many servings of fruits and vegetables do you eat daily?  2-3    On average, how many sweetened beverages do you drink each day (Examples: soda, juice, sweet tea, etc.  Do NOT count diet or artificially sweetened beverages)?   0    How many days per week do you exercise enough to make your heart beat faster? 4    How many minutes a day do you exercise enough to make your heart beat faster? 30 - 60  How many days per week do you miss taking your medication? 1    What makes it hard for you to take your medications?  remembering to take             Patient Active Problem List   Diagnosis     Esophageal reflux     CARDIOVASCULAR SCREENING; LDL GOAL LESS THAN 160     Advanced directives, counseling/discussion     Type 2 diabetes mellitus without complication (H)     Elevated blood pressure reading without diagnosis of hypertension     Past Surgical History:   Procedure Laterality Date     lumbar disc surgery  1986       Social History     Tobacco Use     Smoking status: Former Smoker     Smokeless tobacco: Never Used     Tobacco comment: quit 1978   Substance Use Topics     Alcohol use: No     Family History   Problem Relation Age of Onset     Hypertension Mother      Cardiovascular Mother         CHF     Diabetes Mother      Lung Cancer Father         smoker     Cancer Daughter          brain tumor     Thyroid Disease No family hx of          Current Outpatient Medications   Medication Sig Dispense Refill     CLARITIN 10 MG OR TABS 1 TABLET DAILY prn       metFORMIN (GLUCOPHAGE-XR) 500 MG 24 hr tablet Take 2 tablets (1,000 mg) by mouth daily (with breakfast) 180 tablet 3     omeprazole (PRILOSEC) 20 MG capsule Take 1 capsule by mouth daily. 90 capsule 3     aspirin 81 MG EC tablet Take 1 tablet (81 mg) by mouth daily 90 tablet 3     blood glucose monitoring (NO BRAND SPECIFIED) test strip Use to test blood sugars 2-3 times daily or as directed. ReliOn Prime Glucose test strips, what ever pt wants/Insurance will cover. 200 each 5     Blood Glucose Monitoring Suppl (RELION CONFIRM GLUCOSE MONITOR) W/DEVICE KIT 1 Device 3 times daily (before meals) 1 kit 1     CRANBERRY 2 tablets daily as needed         Lactobacillus (ACIDOPHILUS PROBIOTIC) 100 MG CAPS Take 1 capsule by mouth daily       Phenazopyridine HCl (AZO TABS PO) Take  by mouth. 2 tabs as needed       RELION LANCETS THIN 26G MISC 1 each 3 times daily 300 each 1     STATIN NOT PRESCRIBED (INTENTIONAL) Please choose reason not prescribed, below       No Known Allergies  Recent Labs   Lab Test 12/07/18  1451 11/08/17  1453 06/13/16  1541 02/02/16  1009   A1C 7.1* 7.2* 6.4* 13.4*   LDL 90 69  --  103*   HDL 37* 37*  --  45*   TRIG 130 117  --  116   ALT 60*  --   --   --    CR 0.82 0.96  --  0.76   GFRESTIMATED 69 58*  --  76   GFRESTBLACK 83 70  --  >90   GFR Calc     POTASSIUM 4.1 3.8  --  4.5   TSH 2.54 1.65  --   --         Reviewed and updated as needed this visit by Provider  Tobacco  Allergies  Meds  Problems  Med Hx  Surg Hx  Fam Hx         Review of Systems   Constitutional, neuro, ENT, endocrine, pulmonary, cardiac, gastrointestinal, genitourinary, musculoskeletal, integument and psychiatric systems are negative, except as otherwise noted.        Objective   Reported vitals:  There were no vitals taken for this  visit.   healthy, alert and no distress  PSYCH: Alert and oriented times 3; coherent speech, normal   rate and volume, able to articulate logical thoughts, able   to abstract reason, no tangential thoughts, no hallucinations   or delusions  Her affect is normal and pleasant  RESP: No cough, no audible wheezing, able to talk in full sentences  Remainder of exam unable to be completed due to telephone visits    Diagnostic Test Results:  Labs reviewed in Epic    Patient had Hgb A1c done late Feb in [place] it was 6.2        Assessment/Plan:  1. Type 2 diabetes mellitus without complication, without long-term current use of insulin (H)  Had labs recently in [place]. She did not have urine testing or additional labs - just the A1C.  At that point  Metformin was reduced by the [place] provider to 500mg from 1500mg.  Sigrid noted increase in sugars and on her own increased metformin to 1000mg daily.  Post prandial sugars have been 120-140 at this dose.  Will continue  With this. Future A1c and additional labs ordered to be done after Covid settles down.    - metFORMIN (GLUCOPHAGE-XR) 500 MG 24 hr tablet; Take 2 tablets (1,000 mg) by mouth daily (with breakfast)  Dispense: 180 tablet; Refill: 3  - Albumin Random Urine Quantitative with Creat Ratio; Future  - Lipid panel reflex to direct LDL Fasting; Future  - Comprehensive metabolic panel; Future    2. NAFLD (nonalcoholic fatty liver disease)  Re-check labs.  - Comprehensive metabolic panel; Future    3. Elevated blood pressure reading without diagnosis of hypertension  She reports blood pressures have been well controlled.       Return in about 3 months (around 8/13/2020) for Diabetes re-check.      Phone call duration:  11 minutes    Idalia Gage MD

## 2020-05-13 NOTE — TELEPHONE ENCOUNTER
Please call. As I was closing her chart I noticed she is due for colon cancer screening. We can mail her a FIT (stool) test today do at home and mail back. Order signed. Please mail her a kit.

## 2020-11-16 ENCOUNTER — HEALTH MAINTENANCE LETTER (OUTPATIENT)
Age: 70
End: 2020-11-16

## 2020-12-01 ENCOUNTER — TRANSFERRED RECORDS (OUTPATIENT)
Dept: MULTI SPECIALTY CLINIC | Facility: CLINIC | Age: 70
End: 2020-12-01

## 2020-12-01 LAB — RETINOPATHY: NORMAL

## 2021-03-08 ENCOUNTER — TELEPHONE (OUTPATIENT)
Dept: FAMILY MEDICINE | Facility: CLINIC | Age: 71
End: 2021-03-08

## 2021-03-08 DIAGNOSIS — E11.9 TYPE 2 DIABETES MELLITUS WITHOUT COMPLICATION, WITHOUT LONG-TERM CURRENT USE OF INSULIN (H): ICD-10-CM

## 2021-03-08 NOTE — TELEPHONE ENCOUNTER
"Requested Prescriptions   Pending Prescriptions Disp Refills     metFORMIN (GLUCOPHAGE-XR) 500 MG 24 hr tablet [Pharmacy Med Name: metFORMIN HCl  MG Oral Tablet Extended Release 24 Hour] 180 tablet 0     Sig: TAKE 2 TABLETS BY MOUTH ONCE DAILY WITH BREAKFAST       Biguanide Agents Failed - 3/8/2021 10:33 AM        Failed - Patient has documented A1c within the specified period of time.     If HgbA1C is 8 or greater, it needs to be on file within the past 3 months.  If less than 8, must be on file within the past 6 months.     Recent Labs   Lab Test 12/07/18  1451   A1C 7.1*             Failed - Patient's CR is NOT>1.4 OR Patient's EGFR is NOT<45 within past 12 mos.     Recent Labs   Lab Test 12/07/18  1451   GFRESTIMATED 69   GFRESTBLACK 83       Recent Labs   Lab Test 12/07/18  1451   CR 0.82             Failed - Recent (6 mo) or future (30 days) visit within the authorizing provider's specialty     Patient had office visit in the last 6 months or has a visit in the next 30 days with authorizing provider or within the authorizing provider's specialty.  See \"Patient Info\" tab in inbasket, or \"Choose Columns\" in Meds & Orders section of the refill encounter.            Passed - Patient is age 10 or older        Passed - Patient does NOT have a diagnosis of CHF.        Passed - Medication is active on med list        Passed - Patient is not pregnant        Passed - Patient has not had a positive pregnancy test within the past 12 mos.              "

## 2021-03-09 RX ORDER — METFORMIN HCL 500 MG
TABLET, EXTENDED RELEASE 24 HR ORAL
Qty: 180 TABLET | Refills: 0 | Status: SHIPPED | OUTPATIENT
Start: 2021-03-09 | End: 2021-07-23

## 2021-03-09 NOTE — TELEPHONE ENCOUNTER
Routing refill request to provider for review/approval because:  Labs out of range: Last Labs done 12/7/18.  Advise.  Saadia

## 2021-03-09 NOTE — TELEPHONE ENCOUNTER
Left message on answering machine to return call. Direct line provided. Need to give message below.  Serenity Shen RN

## 2021-03-09 NOTE — TELEPHONE ENCOUNTER
Due for visit - office visit or virtual visit ok. Refilled x 1 month. Further refills will be addressed at visit.

## 2021-04-03 ENCOUNTER — HEALTH MAINTENANCE LETTER (OUTPATIENT)
Age: 71
End: 2021-04-03

## 2021-05-23 ENCOUNTER — HEALTH MAINTENANCE LETTER (OUTPATIENT)
Age: 71
End: 2021-05-23

## 2021-07-23 ENCOUNTER — OFFICE VISIT (OUTPATIENT)
Dept: FAMILY MEDICINE | Facility: CLINIC | Age: 71
End: 2021-07-23
Payer: COMMERCIAL

## 2021-07-23 VITALS
RESPIRATION RATE: 16 BRPM | HEIGHT: 67 IN | HEART RATE: 78 BPM | BODY MASS INDEX: 26.97 KG/M2 | OXYGEN SATURATION: 96 % | WEIGHT: 171.8 LBS | DIASTOLIC BLOOD PRESSURE: 96 MMHG | TEMPERATURE: 97.7 F | SYSTOLIC BLOOD PRESSURE: 150 MMHG

## 2021-07-23 DIAGNOSIS — Z00.00 WELL ADULT EXAM: Primary | ICD-10-CM

## 2021-07-23 DIAGNOSIS — K76.0 NAFLD (NONALCOHOLIC FATTY LIVER DISEASE): ICD-10-CM

## 2021-07-23 DIAGNOSIS — E11.9 TYPE 2 DIABETES MELLITUS WITHOUT COMPLICATION, WITHOUT LONG-TERM CURRENT USE OF INSULIN (H): ICD-10-CM

## 2021-07-23 DIAGNOSIS — L82.1 SEBORRHEIC KERATOSES: ICD-10-CM

## 2021-07-23 DIAGNOSIS — R14.0 BLOATING: ICD-10-CM

## 2021-07-23 DIAGNOSIS — Z12.31 ENCOUNTER FOR SCREENING MAMMOGRAM FOR BREAST CANCER: ICD-10-CM

## 2021-07-23 DIAGNOSIS — Z78.0 ASYMPTOMATIC MENOPAUSAL STATE: ICD-10-CM

## 2021-07-23 DIAGNOSIS — Z12.11 SCREEN FOR COLON CANCER: ICD-10-CM

## 2021-07-23 DIAGNOSIS — L98.9 SKIN LESION: ICD-10-CM

## 2021-07-23 LAB
ALBUMIN SERPL-MCNC: 3.6 G/DL (ref 3.4–5)
ALP SERPL-CCNC: 103 U/L (ref 40–150)
ALT SERPL W P-5'-P-CCNC: 26 U/L (ref 0–50)
ANION GAP SERPL CALCULATED.3IONS-SCNC: 3 MMOL/L (ref 3–14)
AST SERPL W P-5'-P-CCNC: 18 U/L (ref 0–45)
BILIRUB DIRECT SERPL-MCNC: 0.2 MG/DL (ref 0–0.2)
BILIRUB SERPL-MCNC: 0.7 MG/DL (ref 0.2–1.3)
BUN SERPL-MCNC: 10 MG/DL (ref 7–30)
CALCIUM SERPL-MCNC: 9.4 MG/DL (ref 8.5–10.1)
CHLORIDE BLD-SCNC: 107 MMOL/L (ref 94–109)
CHOLEST SERPL-MCNC: 152 MG/DL
CO2 SERPL-SCNC: 29 MMOL/L (ref 20–32)
CREAT SERPL-MCNC: 0.84 MG/DL (ref 0.52–1.04)
FASTING STATUS PATIENT QL REPORTED: YES
GFR SERPL CREATININE-BSD FRML MDRD: 70 ML/MIN/1.73M2
GLUCOSE BLD-MCNC: 164 MG/DL (ref 70–99)
HBA1C MFR BLD: 7.2 % (ref 0–5.6)
HDLC SERPL-MCNC: 44 MG/DL
LDLC SERPL CALC-MCNC: 79 MG/DL
NONHDLC SERPL-MCNC: 108 MG/DL
POTASSIUM BLD-SCNC: 4.6 MMOL/L (ref 3.4–5.3)
PROT SERPL-MCNC: 7 G/DL (ref 6.8–8.8)
SODIUM SERPL-SCNC: 139 MMOL/L (ref 133–144)
TRIGL SERPL-MCNC: 146 MG/DL

## 2021-07-23 PROCEDURE — G0438 PPPS, INITIAL VISIT: HCPCS | Performed by: FAMILY MEDICINE

## 2021-07-23 PROCEDURE — 82248 BILIRUBIN DIRECT: CPT | Performed by: FAMILY MEDICINE

## 2021-07-23 PROCEDURE — 99214 OFFICE O/P EST MOD 30 MIN: CPT | Mod: 25 | Performed by: FAMILY MEDICINE

## 2021-07-23 PROCEDURE — 99207 PR FOOT EXAM NO CHARGE: CPT | Performed by: FAMILY MEDICINE

## 2021-07-23 PROCEDURE — 80053 COMPREHEN METABOLIC PANEL: CPT | Performed by: FAMILY MEDICINE

## 2021-07-23 PROCEDURE — 83036 HEMOGLOBIN GLYCOSYLATED A1C: CPT | Performed by: FAMILY MEDICINE

## 2021-07-23 PROCEDURE — 36415 COLL VENOUS BLD VENIPUNCTURE: CPT | Performed by: FAMILY MEDICINE

## 2021-07-23 PROCEDURE — 17000 DESTRUCT PREMALG LESION: CPT | Performed by: FAMILY MEDICINE

## 2021-07-23 PROCEDURE — 80061 LIPID PANEL: CPT | Performed by: FAMILY MEDICINE

## 2021-07-23 RX ORDER — LISINOPRIL 10 MG/1
10 TABLET ORAL DAILY
COMMUNITY
Start: 2021-03-14 | End: 2021-07-23

## 2021-07-23 RX ORDER — METFORMIN HCL 500 MG
1000 TABLET, EXTENDED RELEASE 24 HR ORAL
Qty: 180 TABLET | Refills: 4 | Status: SHIPPED | OUTPATIENT
Start: 2021-07-23 | End: 2022-01-05

## 2021-07-23 RX ORDER — LISINOPRIL 10 MG/1
10 TABLET ORAL DAILY
Qty: 90 TABLET | Refills: 4 | Status: SHIPPED | OUTPATIENT
Start: 2021-07-23 | End: 2022-07-22

## 2021-07-23 RX ORDER — SIMETHICONE 80 MG
80 TABLET,CHEWABLE ORAL EVERY 6 HOURS PRN
Qty: 120 TABLET | Refills: 11 | Status: SHIPPED | OUTPATIENT
Start: 2021-07-23

## 2021-07-23 ASSESSMENT — MIFFLIN-ST. JEOR: SCORE: 1326.91

## 2021-07-23 NOTE — PROGRESS NOTES
"    Assessment & Plan     Well adult exam    Type 2 diabetes mellitus without complication, without long-term current use of insulin (H)  Well controlled. Refilled medication. Check labs.  Follow-up for re-check in 6 months.   - metFORMIN (GLUCOPHAGE-XR) 500 MG 24 hr tablet; Take 2 tablets (1,000 mg) by mouth daily (with breakfast)  - HEMOGLOBIN A1C; Future  - BASIC METABOLIC PANEL; Future  - Lipid panel reflex to direct LDL Fasting; Future  - Albumin Random Urine Quantitative with Creat Ratio; Future  - FOOT EXAM  - HEMOGLOBIN A1C  - BASIC METABOLIC PANEL  - Lipid panel reflex to direct LDL Fasting  - lisinopril (ZESTRIL) 10 MG tablet; Take 1 tablet (10 mg) by mouth daily    NAFLD (nonalcoholic fatty liver disease)  Due for labs and follow-up imaging. Further work-up and management as dictated by results.   - Hepatic function panel; Future  - US Elastography with Abdomen Limited; Future  - Hepatic function panel    Bloating  Labs and imaging as above. Can try simethicone for symptom relief.  - simethicone (MYLICON) 80 MG chewable tablet; Take 1 tablet (80 mg) by mouth every 6 hours as needed for flatulence or cramping    Skin lesion  ?BCC vs sebaceous hyperplasia vs other. Derm referral given facial location.  - Adult Dermatology Referral; Future    Seborrheic keratoses  - DESTRUCT BENIGN LESION, UP TO 14    Asymptomatic menopausal state  - DEXA HIP/PELVIS/SPINE - Future; Future    Encounter for screening mammogram for breast cancer  - MA SCREENING DIGITAL BILAT - Future  (s+30); Future    Screen for colon cancer  - COLOGUARD(EXACT SCIENCES)             BMI:   Estimated body mass index is 26.91 kg/m  as calculated from the following:    Height as of this encounter: 1.702 m (5' 7\").    Weight as of this encounter: 77.9 kg (171 lb 12.8 oz).           No follow-ups on file.    Idalia Gage MD  [facility]    Jony Matta is a 71 year old who presents for the following " health issues     HPI     Diabetes Follow-up      How often are you checking your blood sugar? Not at all    What concerns do you have today about your diabetes? None     Do you have any of these symptoms? (Select all that apply)  No numbness or tingling in feet.  No redness, sores or blisters on feet.  No complaints of excessive thirst.  No reports of blurry vision.  No significant changes to weight.    Have you had a diabetic eye exam in the last 12 months? Yes- Date of last eye exam: 12/2020,  Location: [facility] in [facility]        BP Readings from Last 2 Encounters:   07/23/21 (!) 150/96   12/07/18 132/82     Hemoglobin A1C (%)   Date Value   07/23/2021 7.2 (H)   12/07/2018 7.1 (H)   11/08/2017 7.2 (H)     LDL Cholesterol Calculated (mg/dL)   Date Value   07/23/2021 79   12/07/2018 90   11/08/2017 69           How many servings of fruits and vegetables do you eat daily?  2-3    On average, how many sweetened beverages do you drink each day (Examples: soda, juice, sweet tea, etc.  Do NOT count diet or artificially sweetened beverages)?   0    How many days per week do you exercise enough to make your heart beat faster? 3 or less    How many minutes a day do you exercise enough to make your heart beat faster? 9 or less    How many days per week do you miss taking your medication? 0    Annual Wellness Visit    Patient has been advised of split billing requirements and indicates understanding: Yes     Are you in the first 12 months of your Medicare Part B coverage?  No    Physical Health:    In general, how would you rate your overall physical health? good    Outside of work, how many days during the week do you exercise?none    Outside of work, approximately how many minutes a day do you exercise?less than 15 minutes    If you drink alcohol do you typically have >3 drinks per day or >7 drinks per week? No    Do you usually eat at least 4 servings of fruit and vegetables a day, include whole grains & fiber  "and avoid regularly eating high fat or \"junk\" foods? NO    Do you have any problems taking medications regularly? No    Do you have any side effects from medications? none    Needs assistance for the following daily activities: no assistance needed    Which of the following safety concerns are present in your home?  none identified     Hearing impairment: No    In the past 6 months, have you been bothered by leaking of urine? yes    Mental Health:    In general, how would you rate your overall mental or emotional health? good  PHQ-2 Score: 0    Do you feel safe in your environment? Yes    Have you ever done Advance Care Planning? (For example, a Health Directive, POLST, or a discussion with a medical provider or your loved ones about your wishes)? No, advance care planning information given to patient to review.  Advanced care planning was discussed at today's visit.    Fall risk:  Fallen 2 or more times in the past year?: No  Any fall with injury in the past year?: No    Cognitive Screenin) Repeat 3 items (Leader, Season, Table)    2) Clock draw: NORMAL  3) 3 item recall: Recalls 3 objects  Results: 3 items recalled: COGNITIVE IMPAIRMENT LESS LIKELY    Mini-CogTM Copyright S Cody. Licensed by the author for use in [facility]; reprinted with permission (soob@.[facility]). All rights reserved.      Do you have sleep apnea, excessive snoring or daytime drowsiness?: no    Current providers sharing in care for this patient include:   Patient Care Team:  Idalia Gage MD as PCP - General (Family Practice)  Idalia Gage MD as Assigned PCP  Pawan Cannon MD as MD (Dermatology)    Patient has been advised of split billing requirements and indicates understanding: Yes    Review of Systems   Constitutional, neuro, ENT, endocrine, pulmonary, cardiac, gastrointestinal, genitourinary, musculoskeletal, integument and psychiatric systems are negative, except as otherwise noted. " "      Objective    BP (!) 150/96   Pulse 78   Temp 97.7  F (36.5  C) (Tympanic)   Resp 16   Ht 1.702 m (5' 7\")   Wt 77.9 kg (171 lb 12.8 oz)   SpO2 96%   BMI 26.91 kg/m    Body mass index is 26.91 kg/m .  Physical Exam   GENERAL: healthy, alert and no distress  EYES: Eyes grossly normal to inspection, PERRL and conjunctivae and sclerae normal  HENT: normal cephalic/atraumatic and ear canals and TM's normal  NECK: no adenopathy, no asymmetry, masses, or scars and thyroid normal to palpation  RESP: lungs clear to auscultation - no rales, rhonchi or wheezes  BREAST: normal without masses, tenderness or nipple discharge and no palpable axillary masses or adenopathy  CV: regular rate and rhythm, normal S1 S2, no S3 or S4, no murmur, click or rub, no peripheral edema and peripheral pulses strong  ABDOMEN: soft, nontender, no hepatosplenomegaly, no masses and bowel sounds normal  MS: no gross musculoskeletal defects noted, no edema  NEURO: Normal strength and tone, mentation intact and speech normal  PSYCH: mentation appears normal, affect normal/bright    SKIN: 3mm papular dome shaped lesion left nasal alar crease. No ulceration.  Seborrheic keratosis  x 1 on trunk treated with 2 cycles LN2.    Lab Results   Component Value Date    A1C 7.2 07/23/2021    A1C 7.1 12/07/2018    A1C 7.2 11/08/2017    A1C 6.4 06/13/2016    A1C 13.4 02/02/2016                  "

## 2021-07-23 NOTE — PATIENT INSTRUCTIONS
Dermatology should call you to schedule an appointment but if not the number is in the referral.  Mail back cologuard colon cancer screening test when you get it.  Schedule mammogram.  * * *  Your BMI is Body mass index is Body mass index is 26.91 kg/m .     A BMI of 18.5 to 24.9 is in the healthy range. A person with a BMI of 25 to 29.9 is considered overweight, and someone with a BMI of 30 or greater is considered obese. More than two-thirds of American adults are considered overweight or obese.  Weight management is a personal decision.  If you are interested in exploring weight loss strategies, the following discussion covers the approaches that may be successful. Body mass index (BMI) is one way to tell whether you are at a healthy weight, overweight, or obese. It measures your weight in relation to your height.  Being overweight or obese increases the risk for further weight gain. Excess weight may lead to heart disease and diabetes.  Creating and following plans for healthy eating and physical activity may help you improve your health.  Weight control is part of healthy lifestyle and includes exercise, emotional health, and healthy eating habits. Careful eating habits lifelong are the mainstay of weight control. Though there are significant health benefits from weight loss, long-term weight loss with diet alone may be very difficult to achieve- studies show long-term success with dietary management alone in less than 10% of people. Attaining a healthy weight may be especially difficult to achieve in those with severe obesity. In some cases, medications, devices and surgical management might be considered.  What can you do?    Keep a food journal to help with mindful eating and finding ways to modify your diet.      Reduce the amount of processed food in your diet. Focus on adding vegetables, and lean proteins.    Reduce dietary carbohydrates. Limiting to  gm of carbohydrates per day has been shows to  help boost weight loss.  If you have diabetes or are on diabetic medications do not do this without talking to your physician or healthcare provider.    Diet combined with exercise helps maintain muscle while optimizing fat loss. Strength training is particularly important for building and maintaining muscle mass. Exercise helps reduce stress, increase energy, and improves fitness. Increasing exercise without diet control, however, may not burn enough calories to loose weight.         Start walking three days a week 10-20 minutes at a time    Work towards walking thirty minutes five days a week      Eventually, increase the speed of your walking for 1-2 minutes at time    In addition, we recommend that you review healthy lifestyles and methods for weight loss available through the National Institutes of Health patient information sites:  http://win.niddk.nih.gov/publications/index.htm    Also look into health and wellness programs that may be available through your health insurance provider, employer, local community center, or jordyn club.

## 2021-08-10 ENCOUNTER — TELEPHONE (OUTPATIENT)
Dept: FAMILY MEDICINE | Facility: CLINIC | Age: 71
End: 2021-08-10

## 2021-08-11 NOTE — TELEPHONE ENCOUNTER
Please call and get updated home blood pressures. Last reading in clinic was high and we were going to monitor before making changes. If no home cuff come in for nurse blood pressure visit.

## 2021-08-11 NOTE — TELEPHONE ENCOUNTER
Patient did not know she was supposed to check blood pressure.  She will start today and call us next week with readings.    Sabina Webb RN

## 2021-08-19 ENCOUNTER — MYC MEDICAL ADVICE (OUTPATIENT)
Dept: FAMILY MEDICINE | Facility: CLINIC | Age: 71
End: 2021-08-19

## 2021-08-24 ENCOUNTER — ALLIED HEALTH/NURSE VISIT (OUTPATIENT)
Dept: FAMILY MEDICINE | Facility: CLINIC | Age: 71
End: 2021-08-24
Payer: COMMERCIAL

## 2021-08-24 VITALS — SYSTOLIC BLOOD PRESSURE: 128 MMHG | DIASTOLIC BLOOD PRESSURE: 87 MMHG

## 2021-08-24 DIAGNOSIS — R03.0 ELEVATED BLOOD PRESSURE READING WITHOUT DIAGNOSIS OF HYPERTENSION: Primary | ICD-10-CM

## 2021-08-24 PROCEDURE — 99207 PR NO CHARGE LOS: CPT | Performed by: FAMILY MEDICINE

## 2021-08-24 NOTE — PATIENT INSTRUCTIONS
Your BMI is Body mass index is There is no height or weight on file to calculate BMI.     A BMI of 18.5 to 24.9 is in the healthy range. A person with a BMI of 25 to 29.9 is considered overweight, and someone with a BMI of 30 or greater is considered obese. More than two-thirds of American adults are considered overweight or obese.  Weight management is a personal decision.  If you are interested in exploring weight loss strategies, the following discussion covers the approaches that may be successful. Body mass index (BMI) is one way to tell whether you are at a healthy weight, overweight, or obese. It measures your weight in relation to your height.  Being overweight or obese increases the risk for further weight gain. Excess weight may lead to heart disease and diabetes.  Creating and following plans for healthy eating and physical activity may help you improve your health.  Weight control is part of healthy lifestyle and includes exercise, emotional health, and healthy eating habits. Careful eating habits lifelong are the mainstay of weight control. Though there are significant health benefits from weight loss, long-term weight loss with diet alone may be very difficult to achieve- studies show long-term success with dietary management alone in less than 10% of people. Attaining a healthy weight may be especially difficult to achieve in those with severe obesity. In some cases, medications, devices and surgical management might be considered.  What can you do?    Keep a food journal to help with mindful eating and finding ways to modify your diet.      Reduce the amount of processed food in your diet. Focus on adding vegetables, and lean proteins.    Reduce dietary carbohydrates. Limiting to  gm of carbohydrates per day has been shows to help boost weight loss.  If you have diabetes or are on diabetic medications do not do this without talking to your physician or healthcare provider.    Diet combined with  exercise helps maintain muscle while optimizing fat loss. Strength training is particularly important for building and maintaining muscle mass. Exercise helps reduce stress, increase energy, and improves fitness. Increasing exercise without diet control, however, may not burn enough calories to loose weight.         Start walking three days a week 10-20 minutes at a time    Work towards walking thirty minutes five days a week      Eventually, increase the speed of your walking for 1-2 minutes at time    In addition, we recommend that you review healthy lifestyles and methods for weight loss available through the National Institutes of Health patient information sites:  http://win.niddk.nih.gov/publications/index.htm    Also look into health and wellness programs that may be available through your health insurance provider, employer, local community center, or jordyn club.

## 2021-08-31 ENCOUNTER — TELEPHONE (OUTPATIENT)
Dept: DERMATOLOGY | Facility: CLINIC | Age: 71
End: 2021-08-31

## 2021-08-31 ENCOUNTER — OFFICE VISIT (OUTPATIENT)
Dept: DERMATOLOGY | Facility: CLINIC | Age: 71
End: 2021-08-31
Payer: COMMERCIAL

## 2021-08-31 VITALS — SYSTOLIC BLOOD PRESSURE: 150 MMHG | HEART RATE: 74 BPM | DIASTOLIC BLOOD PRESSURE: 98 MMHG | OXYGEN SATURATION: 99 %

## 2021-08-31 DIAGNOSIS — D18.01 ANGIOMA OF SKIN: ICD-10-CM

## 2021-08-31 DIAGNOSIS — D23.9 DERMAL NEVUS: ICD-10-CM

## 2021-08-31 DIAGNOSIS — L98.9 SKIN LESION: ICD-10-CM

## 2021-08-31 DIAGNOSIS — C44.01 BASAL CELL CARCINOMA (BCC) OF SKIN OF LEFT UPPER LIP: ICD-10-CM

## 2021-08-31 DIAGNOSIS — L81.4 LENTIGO: Primary | ICD-10-CM

## 2021-08-31 DIAGNOSIS — L82.1 SEBORRHEIC KERATOSIS: ICD-10-CM

## 2021-08-31 PROCEDURE — 11102 TANGNTL BX SKIN SINGLE LES: CPT | Performed by: DERMATOLOGY

## 2021-08-31 PROCEDURE — 88331 PATH CONSLTJ SURG 1 BLK 1SPC: CPT | Performed by: DERMATOLOGY

## 2021-08-31 PROCEDURE — 99203 OFFICE O/P NEW LOW 30 MIN: CPT | Mod: 25 | Performed by: DERMATOLOGY

## 2021-08-31 NOTE — PROGRESS NOTES
Sigrid Mcneill , a 71 year old year old female patient, I was asked to see by  for spot on left lip/ala.  Patient states this has been present for a while.  Patient reports the following symptoms:  new .  Patient reports the following previous treatments none.  Patient reports the following modifying factors none.  Associated symptoms: brown spot son neck.  .  Patient has no other skin complaints today.  Remainder of the HPI, Meds, PMH, Allergies, FH, and SH was reviewed in chart.      Past Medical History:   Diagnosis Date     Hypertension     resolved with weight loss       Past Surgical History:   Procedure Laterality Date     lumbar disc surgery  1986        Family History   Problem Relation Age of Onset     Hypertension Mother      Cardiovascular Mother         CHF     Diabetes Mother      Lung Cancer Father         smoker     Cancer Daughter         brain tumor     Thyroid Disease No family hx of        Social History     Socioeconomic History     Marital status:      Spouse name: Not on file     Number of children: Not on file     Years of education: Not on file     Highest education level: Not on file   Occupational History     Not on file   Tobacco Use     Smoking status: Former Smoker     Smokeless tobacco: Never Used     Tobacco comment: quit 1978   Substance and Sexual Activity     Alcohol use: No     Drug use: No     Sexual activity: Not Currently   Other Topics Concern     Parent/sibling w/ CABG, MI or angioplasty before 65F 55M? No   Social History Narrative     Not on file     Social Determinants of Health     Financial Resource Strain:      Difficulty of Paying Living Expenses:    Food Insecurity:      Worried About Running Out of Food in the Last Year:      Ran Out of Food in the Last Year:    Transportation Needs:      Lack of Transportation (Medical):      Lack of Transportation (Non-Medical):    Physical Activity:      Days of Exercise per Week:      Minutes of Exercise  per Session:    Stress:      Feeling of Stress :    Social Connections:      Frequency of Communication with Friends and Family:      Frequency of Social Gatherings with Friends and Family:      Attends [employer] Services:      Active Member of Clubs or Organizations:      Attends Club or Organization Meetings:      Marital Status:    Intimate Partner Violence:      Fear of Current or Ex-Partner:      Emotionally Abused:      Physically Abused:      Sexually Abused:        Outpatient Encounter Medications as of 8/31/2021   Medication Sig Dispense Refill     aspirin 81 MG EC tablet Take 1 tablet (81 mg) by mouth daily 90 tablet 3     blood glucose monitoring (NO BRAND SPECIFIED) test strip Use to test blood sugars 2-3 times daily or as directed. ReliOn Prime Glucose test strips, what ever pt wants/Insurance will cover. 200 each 5     CLARITIN 10 MG OR TABS 1 TABLET DAILY prn       lisinopril (ZESTRIL) 10 MG tablet Take 1 tablet (10 mg) by mouth daily 90 tablet 4     metFORMIN (GLUCOPHAGE-XR) 500 MG 24 hr tablet Take 2 tablets (1,000 mg) by mouth daily (with breakfast) 180 tablet 4     omeprazole (PRILOSEC) 20 MG capsule Take 1 capsule by mouth daily. 90 capsule 3     RELION LANCETS THIN 26G MISC 1 each 3 times daily 300 each 1     simethicone (MYLICON) 80 MG chewable tablet Take 1 tablet (80 mg) by mouth every 6 hours as needed for flatulence or cramping 120 tablet 11     STATIN NOT PRESCRIBED (INTENTIONAL) Please choose reason not prescribed, below       Blood Glucose Monitoring Suppl (RELION CONFIRM GLUCOSE MONITOR) W/DEVICE KIT 1 Device 3 times daily (before meals) 1 kit 1     No facility-administered encounter medications on file as of 8/31/2021.             Review Of Systems  Skin: As above  Eyes: negative  Ears/Nose/Throat: negative  Respiratory: No shortness of breath, dyspnea on exertion, cough, or hemoptysis  Cardiovascular: negative  Gastrointestinal: negative  Genitourinary: negative  Musculoskeletal:  negative  Neurologic: negative  Psychiatric: negative  Hematologic/Lymphatic/Immunologic: negative  Endocrine: negative      O:   NAD, WDWN, Alert & Oriented, Mood & Affect wnl, Vitals stable   Here today alone   BP (!) 150/98 (BP Location: Left arm, Patient Position: Sitting, Cuff Size: Adult Regular)   Pulse 74   SpO2 99%    General appearance chai ii   Vitals stable   Alert, oriented and in no acute distress     L alar sill/apical triangle 3mm pink pearly papule   Stuck on papules and brown macules on trunk and ext   Red papules on trunk  Flesh colored papules on trunk      The remainder of expanded problem focused exam was normal; the following areas were examined:  conjunctiva/lids, face, neck, lips, chest, digits/nails, RUE, LUE.      Eyes: Conjunctivae/lids:Normal     ENT: Lips, buccal mucosa, tongue: normal    MSK:Normal    Cardiovascular: peripheral edema none    Pulm: Breathing Normal    Lymph Nodes: No Head and Neck Lymphadenopathy     Neuro/Psych: Orientation:Normal; Mood/Affect:Normal      MICRO:   L apical triangle:Orthokeratosis of epidermis with a proliferation of nests of basaloid cells, with peripheral palisading and a haphazard arrangement in the center extending into the dermis, forming nodules.  The tumor cells have hyperchromatic nuclei. Poor cytoplasm and intercellular bridging.    A/P:  1. Seborrheic keratosis, lentigo, angioma, dermal nevus  2. L apical triangle r/o basal cell carcinoma   TANGENTIAL BIOPSY IN HOUSE:  After consent, anesthesia with LEC and prep, tangential excision performed and dx above confirmed with frozen section histology.  No complications and routine wound care.  Patient is not on  anticoagulants and risk of bleeding discussed with patient.       I have personally reviewed all specimens and/or slides and used them with my medical judgement to determine or confirm the final diagnosis.     Patient told result basal cell carcinoma schedule excision .    It was a  pleasure speaking to Sigrid Mcneill today.  Previous clinic  notes and pertinent laboratory tests were reviewed prior to Sigrid Mcneill's visit.  Nature and genetics of benign skin lesions dicussed with patient.  Signs and Symptoms of skin cancer discussed with patient.  Patient encouraged to perform monthly skin exams.  UV precautions reviewed with patient.  Sigrid to follow up with Primary Care provider regarding elevated blood pressure.  Return to clinic next appt

## 2021-08-31 NOTE — LETTER
8/31/2021         RE: Sigrid Mcneill  [address] [facility] [address]        Dear Colleague,    Thank you for referring your patient, Sigrid Mcneill, to the [facility]. Please see a copy of my visit note below.    Sigrid Mcneill , a 71 year old year old female patient, I was asked to see by  for spot on left lip/ala.  Patient states this has been present for a while.  Patient reports the following symptoms:  new .  Patient reports the following previous treatments none.  Patient reports the following modifying factors none.  Associated symptoms: brown spot son neck.  .  Patient has no other skin complaints today.  Remainder of the HPI, Meds, PMH, Allergies, FH, and SH was reviewed in chart.      Past Medical History:   Diagnosis Date     Hypertension     resolved with weight loss       Past Surgical History:   Procedure Laterality Date     lumbar disc surgery  1986        Family History   Problem Relation Age of Onset     Hypertension Mother      Cardiovascular Mother         CHF     Diabetes Mother      Lung Cancer Father         smoker     Cancer Daughter         brain tumor     Thyroid Disease No family hx of        Social History     Socioeconomic History     Marital status:      Spouse name: Not on file     Number of children: Not on file     Years of education: Not on file     Highest education level: Not on file   Occupational History     Not on file   Tobacco Use     Smoking status: Former Smoker     Smokeless tobacco: Never Used     Tobacco comment: quit 1978   Substance and Sexual Activity     Alcohol use: No     Drug use: No     Sexual activity: Not Currently   Other Topics Concern     Parent/sibling w/ CABG, MI or angioplasty before 65F 55M? No   Social History Narrative     Not on file     Social Determinants of Health     Financial Resource Strain:      Difficulty of Paying Living Expenses:    Food Insecurity:      Worried About Running  Out of Food in the Last Year:      Ran Out of Food in the Last Year:    Transportation Needs:      Lack of Transportation (Medical):      Lack of Transportation (Non-Medical):    Physical Activity:      Days of Exercise per Week:      Minutes of Exercise per Session:    Stress:      Feeling of Stress :    Social Connections:      Frequency of Communication with Friends and Family:      Frequency of Social Gatherings with Friends and Family:      Attends [employer] Services:      Active Member of Clubs or Organizations:      Attends Club or Organization Meetings:      Marital Status:    Intimate Partner Violence:      Fear of Current or Ex-Partner:      Emotionally Abused:      Physically Abused:      Sexually Abused:        Outpatient Encounter Medications as of 8/31/2021   Medication Sig Dispense Refill     aspirin 81 MG EC tablet Take 1 tablet (81 mg) by mouth daily 90 tablet 3     blood glucose monitoring (NO BRAND SPECIFIED) test strip Use to test blood sugars 2-3 times daily or as directed. ReliOn Prime Glucose test strips, what ever pt wants/Insurance will cover. 200 each 5     CLARITIN 10 MG OR TABS 1 TABLET DAILY prn       lisinopril (ZESTRIL) 10 MG tablet Take 1 tablet (10 mg) by mouth daily 90 tablet 4     metFORMIN (GLUCOPHAGE-XR) 500 MG 24 hr tablet Take 2 tablets (1,000 mg) by mouth daily (with breakfast) 180 tablet 4     omeprazole (PRILOSEC) 20 MG capsule Take 1 capsule by mouth daily. 90 capsule 3     RELION LANCETS THIN 26G MISC 1 each 3 times daily 300 each 1     simethicone (MYLICON) 80 MG chewable tablet Take 1 tablet (80 mg) by mouth every 6 hours as needed for flatulence or cramping 120 tablet 11     STATIN NOT PRESCRIBED (INTENTIONAL) Please choose reason not prescribed, below       Blood Glucose Monitoring Suppl (RELION CONFIRM GLUCOSE MONITOR) W/DEVICE KIT 1 Device 3 times daily (before meals) 1 kit 1     No facility-administered encounter medications on file as of 8/31/2021.              Review Of Systems  Skin: As above  Eyes: negative  Ears/Nose/Throat: negative  Respiratory: No shortness of breath, dyspnea on exertion, cough, or hemoptysis  Cardiovascular: negative  Gastrointestinal: negative  Genitourinary: negative  Musculoskeletal: negative  Neurologic: negative  Psychiatric: negative  Hematologic/Lymphatic/Immunologic: negative  Endocrine: negative      O:   NAD, WDWN, Alert & Oriented, Mood & Affect wnl, Vitals stable   Here today alone   BP (!) 150/98 (BP Location: Left arm, Patient Position: Sitting, Cuff Size: Adult Regular)   Pulse 74   SpO2 99%    General appearance chai ii   Vitals stable   Alert, oriented and in no acute distress     L alar sill/apical triangle 3mm pink pearly papule   Stuck on papules and brown macules on trunk and ext   Red papules on trunk  Flesh colored papules on trunk      The remainder of expanded problem focused exam was normal; the following areas were examined:  conjunctiva/lids, face, neck, lips, chest, digits/nails, RUE, LUE.      Eyes: Conjunctivae/lids:Normal     ENT: Lips, buccal mucosa, tongue: normal    MSK:Normal    Cardiovascular: peripheral edema none    Pulm: Breathing Normal    Lymph Nodes: No Head and Neck Lymphadenopathy     Neuro/Psych: Orientation:Normal; Mood/Affect:Normal      MICRO:   L apical triangle:Orthokeratosis of epidermis with a proliferation of nests of basaloid cells, with peripheral palisading and a haphazard arrangement in the center extending into the dermis, forming nodules.  The tumor cells have hyperchromatic nuclei. Poor cytoplasm and intercellular bridging.    A/P:  1. Seborrheic keratosis, lentigo, angioma, dermal nevus  2. L apical triangle r/o basal cell carcinoma   TANGENTIAL BIOPSY IN HOUSE:  After consent, anesthesia with LEC and prep, tangential excision performed and dx above confirmed with frozen section histology.  No complications and routine wound care.  Patient is not on  anticoagulants and risk  of bleeding discussed with patient.       I have personally reviewed all specimens and/or slides and used them with my medical judgement to determine or confirm the final diagnosis.     Patient told result basal cell carcinoma schedule excision .    It was a pleasure speaking to Sigrid Mcneill today.  Previous clinic  notes and pertinent laboratory tests were reviewed prior to Sigrid Mcneill's visit.  Nature and genetics of benign skin lesions dicussed with patient.  Signs and Symptoms of skin cancer discussed with patient.  Patient encouraged to perform monthly skin exams.  UV precautions reviewed with patient.  Sigrid to follow up with Primary Care provider regarding elevated blood pressure.  Return to clinic next appt        Again, thank you for allowing me to participate in the care of your patient.        Sincerely,        Pawan Cannon MD

## 2021-08-31 NOTE — TELEPHONE ENCOUNTER
----- Message from Pawan Cannon MD sent at 8/31/2021 12:49 PM CDT -----  L apical triangle (lip) basal cell carcinoma schedule excision

## 2021-08-31 NOTE — LETTER
September 1, 2021      Sigrid Mcneill  [address] [facility] [address]              Dear Sigrid,    You are scheduled for Mohs Surgery on  September 27th at 7:45 am     We are located at the [facility] in [place]. Please check in at the Dermatology Clinic located on the 2nd floor at the end of the johnson.    You don't need to arrive more than 5-10 minutes prior to your appointment time.    Be sure to eat a good breakfast and bathe and wash your hair prior to Surgery.   If you are taking any anti-coagulants that are prescribed by your Doctor (such as Coumadin/warfarin, Plavix, Aspirin, Ibuprofen), please continue taking them.    However, If you are taking anti-coagulants over the counter without a Doctor's order for a Medical condition, please discontinue them 10 days prior to Surgery.      Please wear comfortable clothing as you could possibly be in the clinic for 4-6 hours for your surgery.    Pawan Cannon [phone]

## 2021-08-31 NOTE — NURSING NOTE
Chief Complaint   Patient presents with     Derm Problem     lesion on left nasal crease      Ramona Flores on 8/31/2021 at 10:08 AM

## 2021-09-01 NOTE — TELEPHONE ENCOUNTER
Spoke with patient and reviewed results. Pt verbalized understanding. [facility] and info mailed.   Jeannie SOTO RN BSN PHN  [facility]

## 2021-09-01 NOTE — TELEPHONE ENCOUNTER
Left message for pt to call clinic for results. [facility]. Letter pended.  Jeannie SOTO RN BSN PHN  [facility]

## 2021-09-02 ENCOUNTER — ANCILLARY PROCEDURE (OUTPATIENT)
Dept: GENERAL RADIOLOGY | Facility: CLINIC | Age: 71
End: 2021-09-02
Attending: NURSE PRACTITIONER
Payer: COMMERCIAL

## 2021-09-02 ENCOUNTER — OFFICE VISIT (OUTPATIENT)
Dept: FAMILY MEDICINE | Facility: CLINIC | Age: 71
End: 2021-09-02
Payer: COMMERCIAL

## 2021-09-02 VITALS
RESPIRATION RATE: 16 BRPM | DIASTOLIC BLOOD PRESSURE: 80 MMHG | TEMPERATURE: 97 F | WEIGHT: 168 LBS | SYSTOLIC BLOOD PRESSURE: 140 MMHG | BODY MASS INDEX: 26.37 KG/M2 | HEIGHT: 67 IN | OXYGEN SATURATION: 98 % | HEART RATE: 72 BPM

## 2021-09-02 DIAGNOSIS — S89.91XA KNEE INJURY, RIGHT, INITIAL ENCOUNTER: Primary | ICD-10-CM

## 2021-09-02 DIAGNOSIS — S89.91XA KNEE INJURY, RIGHT, INITIAL ENCOUNTER: ICD-10-CM

## 2021-09-02 PROCEDURE — 73562 X-RAY EXAM OF KNEE 3: CPT | Mod: RT | Performed by: RADIOLOGY

## 2021-09-02 PROCEDURE — 99213 OFFICE O/P EST LOW 20 MIN: CPT | Performed by: NURSE PRACTITIONER

## 2021-09-02 ASSESSMENT — MIFFLIN-ST. JEOR: SCORE: 1309.67

## 2021-09-02 NOTE — PATIENT INSTRUCTIONS
I think this is a meniscus injury.   Continue doing what you're doing. It's ok to continue ibuprofen as needed. Icing can be helpful as well.  Try the exercises on the handout.  If not improving in a few weeks, let me know.

## 2021-09-02 NOTE — PROGRESS NOTES
"    Assessment & Plan     Knee injury, right, initial encounter  XR negative. Suspect medial mensicus injury. This is improving. Continue icing, NSAIDs. Given home PT handout. If not continuing to improve over next few weeks, she will let me know.  - XR Knee Right 3 Views; Future       Patient Instructions   I think this is a meniscus injury.   Continue doing what you're doing. It's ok to continue ibuprofen as needed. Icing can be helpful as well.  Try the exercises on the handout.  If not improving in a few weeks, let me know.      Return in about 1 month (around 10/2/2021) for worsening or continued symptoms.    MICHELLE Velazquez CNP  [facility]    Jony Matta is a 71 year old who presents for the following health issues     HPI     Musculoskeletal problem/pain  Onset/Duration: x 2 weeks ago  Description  Location: knee - right  Joint Swelling: YES  Redness: no  Pain: YES and stiffness  Warmth: no  Intensity:  moderate  Progression of Symptoms:  improving and constant  Accompanying signs and symptoms:   Fevers: no  Numbness/tingling/weakness: no  History  Trauma to the area: no  Recent illness:  no  Previous similar problem: no  Previous evaluation:  no  Precipitating or alleviating factors:  Aggravating factors include: walking and climbing stairs  Therapies tried and outcome: deep heating rub -aspercreme    Above HPI reviewed. Additionally, no know injury. Awoke with posterior knee pain, now to medial aspect. Has improved over past 2 weeks with icing, NSAIDs, compression sleeve. Worse when going upstairs. No previous injury.         Review of Systems   Constitutional, HEENT, cardiovascular, pulmonary, gi and gu systems are negative, except as otherwise noted.      Objective    BP (!) 140/80 (BP Location: Right arm, Patient Position: Sitting, Cuff Size: Adult Large)   Pulse 72   Temp 97  F (36.1  C) (Tympanic)   Resp 16   Ht 1.702 m (5' 7\")   Wt 76.2 kg (168 lb)   " SpO2 98%   Breastfeeding No   BMI 26.31 kg/m    Body mass index is 26.31 kg/m .  Physical Exam  Vitals and nursing note reviewed.   Constitutional:       General: She is not in acute distress.     Appearance: Normal appearance.   HENT:      Head: Normocephalic and atraumatic.      Mouth/Throat:      Mouth: Mucous membranes are moist.   Cardiovascular:      Rate and Rhythm: Normal rate.   Pulmonary:      Effort: Pulmonary effort is normal.   Musculoskeletal:      Cervical back: Neck supple.      Comments: Right knee - no skin changes, no deformity. Mild swelling noted to medial aspect of the knee when compared to the contralateral joint. TTP of the medial joint line. Negative Lachman's and Angelica's. Joint is stable.   Skin:     General: Skin is warm and dry.   Neurological:      General: No focal deficit present.      Mental Status: She is alert.   Psychiatric:         Mood and Affect: Mood normal.         Behavior: Behavior normal.            Xray - Reviewed and interpreted by me.  Right knee - negative

## 2021-09-12 ENCOUNTER — HEALTH MAINTENANCE LETTER (OUTPATIENT)
Age: 71
End: 2021-09-12

## 2021-10-01 ENCOUNTER — TRANSFERRED RECORDS (OUTPATIENT)
Dept: HEALTH INFORMATION MANAGEMENT | Facility: CLINIC | Age: 71
End: 2021-10-01

## 2021-10-01 LAB — RETINOPATHY: NORMAL

## 2021-11-07 ENCOUNTER — HEALTH MAINTENANCE LETTER (OUTPATIENT)
Age: 71
End: 2021-11-07

## 2022-01-02 DIAGNOSIS — E11.9 TYPE 2 DIABETES MELLITUS WITHOUT COMPLICATION, WITHOUT LONG-TERM CURRENT USE OF INSULIN (H): ICD-10-CM

## 2022-01-05 RX ORDER — METFORMIN HCL 500 MG
TABLET, EXTENDED RELEASE 24 HR ORAL
Qty: 180 TABLET | Refills: 0 | Status: SHIPPED | OUTPATIENT
Start: 2022-01-05 | End: 2022-03-30

## 2022-02-27 ENCOUNTER — HEALTH MAINTENANCE LETTER (OUTPATIENT)
Age: 72
End: 2022-02-27

## 2022-03-25 DIAGNOSIS — E11.9 TYPE 2 DIABETES MELLITUS WITHOUT COMPLICATION, WITHOUT LONG-TERM CURRENT USE OF INSULIN (H): ICD-10-CM

## 2022-03-28 NOTE — TELEPHONE ENCOUNTER
"Requested Prescriptions   Pending Prescriptions Disp Refills    metFORMIN (GLUCOPHAGE-XR) 500 MG 24 hr tablet [Pharmacy Med Name: metFORMIN HCl  MG Oral Tablet Extended Release 24 Hour] 180 tablet 0     Sig: TAKE 2 TABLETS BY MOUTH ONCE DAILY WITH BREAKFAST        Biguanide Agents Failed - 3/25/2022 11:42 AM        Failed - Patient has documented A1c within the specified period of time.     If HgbA1C is 8 or greater, it needs to be on file within the past 3 months.  If less than 8, must be on file within the past 6 months.     Recent Labs   Lab Test 07/23/21  0842   A1C 7.2*             Failed - Recent (6 mo) or future (30 days) visit within the authorizing provider's specialty     Patient had office visit in the last 6 months or has a visit in the next 30 days with authorizing provider or within the authorizing provider's specialty.  See \"Patient Info\" tab in inbasket, or \"Choose Columns\" in Meds & Orders section of the refill encounter.            Passed - Patient is age 10 or older        Passed - Patient's CR is NOT>1.4 OR Patient's EGFR is NOT<45 within past 12 mos.       Recent Labs   Lab Test 07/23/21  0842 12/07/18  1451   GFRESTIMATED 70 69   GFRESTBLACK  --  83       Recent Labs   Lab Test 07/23/21  0842   CR 0.84             Passed - Patient does NOT have a diagnosis of CHF.        Passed - Medication is active on med list        Passed - Patient is not pregnant        Passed - Patient has not had a positive pregnancy test within the past 12 mos.               "

## 2022-03-30 RX ORDER — METFORMIN HCL 500 MG
TABLET, EXTENDED RELEASE 24 HR ORAL
Qty: 180 TABLET | Refills: 0 | Status: SHIPPED | OUTPATIENT
Start: 2022-03-30 | End: 2022-07-22

## 2022-03-30 NOTE — TELEPHONE ENCOUNTER
Due for office visit for diabetic labs and diabetes recheck and blood pressure recheck..  Future lab order placed.  Meds refilled x90 days.  Further refills will be addressed at office visit.

## 2022-07-22 ENCOUNTER — MYC MEDICAL ADVICE (OUTPATIENT)
Dept: FAMILY MEDICINE | Facility: CLINIC | Age: 72
End: 2022-07-22

## 2022-07-22 DIAGNOSIS — E11.9 TYPE 2 DIABETES MELLITUS WITHOUT COMPLICATION, WITHOUT LONG-TERM CURRENT USE OF INSULIN (H): ICD-10-CM

## 2022-07-22 RX ORDER — LISINOPRIL 10 MG/1
10 TABLET ORAL DAILY
Qty: 90 TABLET | Refills: 0 | Status: SHIPPED | OUTPATIENT
Start: 2022-07-22 | End: 2022-08-10

## 2022-07-22 RX ORDER — METFORMIN HCL 500 MG
TABLET, EXTENDED RELEASE 24 HR ORAL
Qty: 180 TABLET | Refills: 0 | Status: SHIPPED | OUTPATIENT
Start: 2022-07-22 | End: 2022-08-10

## 2022-07-22 NOTE — TELEPHONE ENCOUNTER
"Medication is being filled for 1 time refill only due to:  due for appointment/scheduled 8/10     Pending Prescriptions:                       Disp   Refills    metFORMIN (GLUCOPHAGE XR) 500 MG 24 hr ta*180 ta*0            Sig: TAKE 2 TABLETS BY MOUTH ONCE DAILY WITH BREAKFAST    lisinopril (ZESTRIL) 10 MG tablet         90 tab*0            Sig: Take 1 tablet (10 mg) by mouth daily    Prescription approved per [facility] Refill Protocol.    Requested Prescriptions   Pending Prescriptions Disp Refills     metFORMIN (GLUCOPHAGE XR) 500 MG 24 hr tablet 180 tablet 0     Sig: TAKE 2 TABLETS BY MOUTH ONCE DAILY WITH BREAKFAST       Biguanide Agents Failed - 7/22/2022  1:17 PM        Failed - Patient has documented A1c within the specified period of time.     If HgbA1C is 8 or greater, it needs to be on file within the past 3 months.  If less than 8, must be on file within the past 6 months.     Recent Labs   Lab Test 07/23/21  0842   A1C 7.2*             Passed - Patient is age 10 or older        Passed - Patient's CR is NOT>1.4 OR Patient's EGFR is NOT<45 within past 12 mos.     Recent Labs   Lab Test 07/23/21  0842 12/07/18  1451   GFRESTIMATED 70 69   GFRESTBLACK  --  83       Recent Labs   Lab Test 07/23/21  0842   CR 0.84             Passed - Patient does NOT have a diagnosis of CHF.        Passed - Medication is active on med list        Passed - Patient is not pregnant        Passed - Patient has not had a positive pregnancy test within the past 12 mos.         Passed - Recent (6 mo) or future (30 days) visit within the authorizing provider's specialty     Patient had office visit in the last 6 months or has a visit in the next 30 days with authorizing provider or within the authorizing provider's specialty.  See \"Patient Info\" tab in inbasket, or \"Choose Columns\" in Meds & Orders section of the refill encounter.               lisinopril (ZESTRIL) 10 MG tablet 90 tablet 0     Sig: Take 1 tablet (10 mg) by mouth daily    " "   ACE Inhibitors (Including Combos) Protocol Failed - 7/22/2022  1:17 PM        Failed - Blood pressure under 140/90 in past 12 months     BP Readings from Last 3 Encounters:   09/02/21 (!) 140/80   08/31/21 (!) 150/98   08/24/21 128/87                 Passed - Recent (12 mo) or future (30 days) visit within the authorizing provider's specialty     Patient has had an office visit with the authorizing provider or a provider within the authorizing providers department within the previous 12 mos or has a future within next 30 days. See \"Patient Info\" tab in inbasket, or \"Choose Columns\" in Meds & Orders section of the refill encounter.              Passed - Medication is active on med list        Passed - Patient is age 18 or older        Passed - No active pregnancy on record        Passed - Normal serum creatinine on file in past 12 months     Recent Labs   Lab Test 07/23/21  0842   CR 0.84       Ok to refill medication if creatinine is low          Passed - Normal serum potassium on file in past 12 months     Recent Labs   Lab Test 07/23/21  0842   POTASSIUM 4.6             Passed - No positive pregnancy test within past 12 months           Kelli Dejesus RN on 7/22/2022 at 1:19 PM      "

## 2022-07-22 NOTE — TELEPHONE ENCOUNTER
Patient requesting fill to get her to August appt.  Will run out of these medications end of July.

## 2022-08-10 ENCOUNTER — OFFICE VISIT (OUTPATIENT)
Dept: FAMILY MEDICINE | Facility: CLINIC | Age: 72
End: 2022-08-10
Payer: COMMERCIAL

## 2022-08-10 VITALS
DIASTOLIC BLOOD PRESSURE: 86 MMHG | HEIGHT: 67 IN | BODY MASS INDEX: 25.43 KG/M2 | RESPIRATION RATE: 16 BRPM | OXYGEN SATURATION: 95 % | HEART RATE: 85 BPM | TEMPERATURE: 97.6 F | SYSTOLIC BLOOD PRESSURE: 130 MMHG | WEIGHT: 162 LBS

## 2022-08-10 DIAGNOSIS — Z00.00 ENCOUNTER FOR MEDICARE ANNUAL WELLNESS EXAM: Primary | ICD-10-CM

## 2022-08-10 DIAGNOSIS — Z78.0 MENOPAUSE: ICD-10-CM

## 2022-08-10 DIAGNOSIS — E11.9 TYPE 2 DIABETES MELLITUS WITHOUT COMPLICATION, WITHOUT LONG-TERM CURRENT USE OF INSULIN (H): ICD-10-CM

## 2022-08-10 LAB
CREAT UR-MCNC: 57 MG/DL
HBA1C MFR BLD: 6.9 % (ref 0–5.6)
MICROALBUMIN UR-MCNC: 9 MG/L
MICROALBUMIN/CREAT UR: 15.79 MG/G CR (ref 0–25)

## 2022-08-10 PROCEDURE — 83036 HEMOGLOBIN GLYCOSYLATED A1C: CPT | Performed by: FAMILY MEDICINE

## 2022-08-10 PROCEDURE — 99214 OFFICE O/P EST MOD 30 MIN: CPT | Mod: 25 | Performed by: FAMILY MEDICINE

## 2022-08-10 PROCEDURE — G0439 PPPS, SUBSEQ VISIT: HCPCS | Performed by: FAMILY MEDICINE

## 2022-08-10 PROCEDURE — 82043 UR ALBUMIN QUANTITATIVE: CPT | Performed by: FAMILY MEDICINE

## 2022-08-10 PROCEDURE — 99207 PR FOOT EXAM NO CHARGE: CPT | Performed by: FAMILY MEDICINE

## 2022-08-10 PROCEDURE — 36415 COLL VENOUS BLD VENIPUNCTURE: CPT | Performed by: FAMILY MEDICINE

## 2022-08-10 RX ORDER — LISINOPRIL 10 MG/1
10 TABLET ORAL DAILY
Qty: 90 TABLET | Refills: 4 | Status: SHIPPED | OUTPATIENT
Start: 2022-08-10 | End: 2023-08-17

## 2022-08-10 RX ORDER — METFORMIN HCL 500 MG
TABLET, EXTENDED RELEASE 24 HR ORAL
Qty: 180 TABLET | Refills: 4 | Status: SHIPPED | OUTPATIENT
Start: 2022-08-10 | End: 2023-08-17

## 2022-08-10 RX ORDER — CHLORAL HYDRATE 500 MG
CAPSULE ORAL DAILY
COMMUNITY
End: 2024-09-04

## 2022-08-10 RX ORDER — VIT C/B6/B5/MAGNESIUM/HERB 173 50-5-6-5MG
CAPSULE ORAL
COMMUNITY

## 2022-08-10 ASSESSMENT — ANXIETY QUESTIONNAIRES
GAD7 TOTAL SCORE: 0
IF YOU CHECKED OFF ANY PROBLEMS ON THIS QUESTIONNAIRE, HOW DIFFICULT HAVE THESE PROBLEMS MADE IT FOR YOU TO DO YOUR WORK, TAKE CARE OF THINGS AT HOME, OR GET ALONG WITH OTHER PEOPLE: NOT DIFFICULT AT ALL
3. WORRYING TOO MUCH ABOUT DIFFERENT THINGS: NOT AT ALL
5. BEING SO RESTLESS THAT IT IS HARD TO SIT STILL: NOT AT ALL
1. FEELING NERVOUS, ANXIOUS, OR ON EDGE: NOT AT ALL
7. FEELING AFRAID AS IF SOMETHING AWFUL MIGHT HAPPEN: NOT AT ALL
2. NOT BEING ABLE TO STOP OR CONTROL WORRYING: NOT AT ALL
GAD7 TOTAL SCORE: 0
6. BECOMING EASILY ANNOYED OR IRRITABLE: NOT AT ALL

## 2022-08-10 ASSESSMENT — PATIENT HEALTH QUESTIONNAIRE - PHQ9
5. POOR APPETITE OR OVEREATING: NOT AT ALL
SUM OF ALL RESPONSES TO PHQ QUESTIONS 1-9: 0

## 2022-08-10 ASSESSMENT — PAIN SCALES - GENERAL: PAINLEVEL: NO PAIN (0)

## 2022-08-10 NOTE — NURSING NOTE
"Initial Resp 16   Ht 1.702 m (5' 7\")   Wt 73.5 kg (162 lb)   BMI 25.37 kg/m   Estimated body mass index is 25.37 kg/m  as calculated from the following:    Height as of this encounter: 1.702 m (5' 7\").    Weight as of this encounter: 73.5 kg (162 lb). .      "

## 2022-08-10 NOTE — NURSING NOTE
"Initial There were no vitals taken for this visit. Estimated body mass index is 26.31 kg/m  as calculated from the following:    Height as of 9/2/21: 1.702 m (5' 7\").    Weight as of 9/2/21: 76.2 kg (168 lb). .    "

## 2022-08-10 NOTE — NURSING NOTE
"Initial /86   Pulse 85   Temp 97.6  F (36.4  C) (Tympanic)   Resp 16   Ht 1.702 m (5' 7\")   Wt 73.5 kg (162 lb)   SpO2 95%   BMI 25.37 kg/m   Estimated body mass index is 25.37 kg/m  as calculated from the following:    Height as of this encounter: 1.702 m (5' 7\").    Weight as of this encounter: 73.5 kg (162 lb). .      "

## 2022-08-10 NOTE — PROGRESS NOTES
"  Assessment & Plan     Encounter for Medicare annual wellness exam  Declines mammogram, colonoscopy, any vaccination except the tetanus vaccine.  She is aware of risks of this.    Type 2 diabetes mellitus without complication, without long-term current use of insulin (H)  Well controlled. Refilled medication. Check labs.  Follow-up for re-check in 6 months.   - Hemoglobin A1c; Future  - Albumin Random Urine Quantitative with Creat Ratio; Future  - Hemoglobin A1c  - lisinopril (ZESTRIL) 10 MG tablet; Take 1 tablet (10 mg) by mouth daily  - metFORMIN (GLUCOPHAGE XR) 500 MG 24 hr tablet; TAKE 2 TABLETS BY MOUTH ONCE DAILY WITH BREAKFAST  - FOOT EXAM  - Albumin Random Urine Quantitative with Creat Ratio  - Lipid panel reflex to direct LDL Fasting; Future  - Basic metabolic panel; Future    Menopause  - DEXA HIP/PELVIS/SPINE - Future; Future             BMI:   Estimated body mass index is 25.37 kg/m  as calculated from the following:    Height as of this encounter: 1.702 m (5' 7\").    Weight as of this encounter: 73.5 kg (162 lb).           Return in about 6 months (around 2/10/2023) for Diabetes re-check.    Idalia Gage MD  [facility]    Jony Matta is a 72 year old, presenting for the following health issues:  Diabetes and Wellness Visit      History of Present Illness       Diabetes:   She presents for follow up of diabetes.  She is not checking blood glucose. She has no concerns regarding her diabetes at this time.  She is not experiencing numbness or burning in feet, excessive thirst, blurry vision, weight changes or redness, sores or blisters on feet. The patient has had a diabetic eye exam in the last 12 months. Eye exam performed on Oct 2021. Location of last eye exam [facility].        Hypertension: She presents for follow up of hypertension.  She does not check blood pressure  regularly outside of the clinic. Outside blood pressures have been over " "140/90. She does not follow a low salt diet.         Annual Wellness Visit    Patient has been advised of split billing requirements and indicates understanding: Yes     Are you in the first 12 months of your Medicare Part B coverage?  No    Physical Health:    In general, how would you rate your overall physical health? excellent    Outside of work, how many days during the week do you exercise?6-7 days/week    Outside of work, approximately how many minutes a day do you exercise?greater than 60 minutes    If you drink alcohol do you typically have >3 drinks per day or >7 drinks per week? No    Do you usually eat at least 4 servings of fruit and vegetables a day, include whole grains & fiber and avoid regularly eating high fat or \"junk\" foods? Yes    Do you have any problems taking medications regularly? No    Do you have any side effects from medications? none    Needs assistance for the following daily activities: no assistance needed    Which of the following safety concerns are present in your home?  none identified     Hearing impairment: No    In the past 6 months, have you been bothered by leaking of urine? yes    Mental Health:    In general, how would you rate your overall mental or emotional health? excellent  PHQ-2 Score: 0    Do you feel safe in your environment? Yes    Have you ever done Advance Care Planning? (For example, a Health Directive, POLST, or a discussion with a medical provider or your loved ones about your wishes)? No, advance care planning information given to patient to review.  Patient declined advance care planning discussion at this time.    Fall risk:  Fallen 2 or more times in the past year?: No  Any fall with injury in the past year?: No    Cognitive Screenin) Repeat 3 items (Leader, Season, Table)    2) Clock draw: NORMAL  3) 3 item recall: Recalls 3 objects  Results: 3 items recalled: COGNITIVE IMPAIRMENT LESS LIKELY    Mini-CogTM Copyright CARMENZA Ross. Licensed by the author " "for use in [employer]; reprinted with permission ([email or web address]). All rights reserved.      Do you have sleep apnea, excessive snoring or daytime drowsiness?: no    Current providers sharing in care for this patient include:   Patient Care Team:  Idalia Gage MD as PCP - General (Family Practice)  Pawan Cannon MD as MD (Dermatology)  Pawan Cannon MD as Assigned Surgical Provider  Idalia Gage MD as Assigned PCP    Patient has been advised of split billing requirements and indicates understanding: Yes      Review of Systems   Constitutional, neuro, ENT, endocrine, pulmonary, cardiac, gastrointestinal, genitourinary, musculoskeletal, integument and psychiatric systems are negative, except as otherwise noted.       Objective    /86   Pulse 85   Temp 97.6  F (36.4  C) (Tympanic)   Resp 16   Ht 1.702 m (5' 7\")   Wt 73.5 kg (162 lb)   SpO2 95%   BMI 25.37 kg/m    Body mass index is 25.37 kg/m .  Physical Exam   GENERAL: healthy, alert and no distress  EYES: Eyes grossly normal to inspection, PERRL and conjunctivae and sclerae normal  HENT: normal cephalic/atraumatic and ear canals and TM's normal  NECK: no adenopathy, no asymmetry, masses, or scars and thyroid normal to palpation  RESP: lungs clear to auscultation - no rales, rhonchi or wheezes  BREAST:declined.  CV: regular rate and rhythm, normal S1 S2, no S3 or S4, no murmur, click or rub, no peripheral edema and peripheral pulses strong  ABDOMEN: soft, nontender, no hepatosplenomegaly, no masses and bowel sounds normal  MS: no gross musculoskeletal defects noted, no edema  SKIN: no suspicious lesions or rashes  NEURO: Normal strength and tone, mentation intact and speech normal  PSYCH: mentation appears normal, affect normal/bright                    .  ..  "

## 2022-11-19 ENCOUNTER — HEALTH MAINTENANCE LETTER (OUTPATIENT)
Age: 72
End: 2022-11-19

## 2023-04-09 ENCOUNTER — HEALTH MAINTENANCE LETTER (OUTPATIENT)
Age: 73
End: 2023-04-09

## 2023-05-18 ENCOUNTER — MYC MEDICAL ADVICE (OUTPATIENT)
Dept: FAMILY MEDICINE | Facility: CLINIC | Age: 73
End: 2023-05-18
Payer: COMMERCIAL

## 2023-05-18 NOTE — TELEPHONE ENCOUNTER
Patient Quality Outreach    Patient is due for the following:   Breast Cancer Screening - Mammogram    Next Steps:   Patient has upcoming appointment, these items will be addressed at that time.    Type of outreach:    Sent [employer] message.      Questions for provider review:    None           Gloria Mosquera

## 2023-06-12 ENCOUNTER — DOCUMENTATION ONLY (OUTPATIENT)
Dept: FAMILY MEDICINE | Facility: CLINIC | Age: 73
End: 2023-06-12
Payer: COMMERCIAL

## 2023-06-12 NOTE — PROGRESS NOTES
Routing request to provider for review - Potential open statin use in persons with diabetes (SUPD)       Our prescription records show that Sigrid Mcneill, who is a [employer] Medicare Advantage plan member, has diabetes, and may not be on a statin medication.     Please review the following current drug therapy for them and consider prescribing them a statin, if clinically appropriate, and no contraindications.      The 10-year ASCVD risk score (Sahil HINTON, et al., 2019) is: 21.3%    Values used to calculate the score:      Age: 72 years      Sex: Female      Is Non- : No      Diabetic: Yes      Tobacco smoker: No      Systolic Blood Pressure: 130 mmHg      Is BP treated: No      HDL Cholesterol: 44 mg/dL      Total Cholesterol: 152 mg/dL    These statins are available on the [employer] Medicare Advantage Prescription Drug plan (MAPD) formulary:       Atorvastatin     Fluvastatin    Livalo     Lovastatin    Pravastatin    Rosuvastatin    Simvastatin        This request aligns with current guidelines published by the American College of Cardiology/American Heart Association (ACC/AHA) and the American Diabetes Association, which recommend statin therapy for patients, ages 40-75, with diabetes to help prevent atherosclerotic cardiovascular disease (ASCVD).        Thank you,     Autumn Olguin, PharmD, [facility]

## 2023-06-14 NOTE — TELEPHONE ENCOUNTER
She has not had lipids done in several years.  I have ordered them each year but for some reason she has not had them done.  We can address this at her physical in the fall.  She does have an open lab order for lipids if she like to have the blood draw done prior.

## 2023-06-29 ENCOUNTER — MYC MEDICAL ADVICE (OUTPATIENT)
Dept: FAMILY MEDICINE | Facility: CLINIC | Age: 73
End: 2023-06-29
Payer: COMMERCIAL

## 2023-06-29 NOTE — TELEPHONE ENCOUNTER
Patient Quality Outreach    Patient is due for the following:   Diabetes -  A1C and Eye Exam    Next Steps:   Schedule a Adult Preventative after August 10    Type of outreach:    Sent [employer] message.      Questions for provider review:    None           Gloria Mosquera

## 2023-08-17 ENCOUNTER — TELEPHONE (OUTPATIENT)
Dept: FAMILY MEDICINE | Facility: CLINIC | Age: 73
End: 2023-08-17
Payer: COMMERCIAL

## 2023-08-17 DIAGNOSIS — E11.9 TYPE 2 DIABETES MELLITUS WITHOUT COMPLICATION, WITHOUT LONG-TERM CURRENT USE OF INSULIN (H): ICD-10-CM

## 2023-08-17 NOTE — TELEPHONE ENCOUNTER
Patient called and is asking if she can get a refill on Lisinopril and metformin.  She will be out and does not have an appt until 9/7/2023.  Patient asked to be notified when meds are sent to the selected pharmacy please.        Adrianne Hernández on 8/17/2023 at 12:15 PM

## 2023-08-18 RX ORDER — METFORMIN HCL 500 MG
TABLET, EXTENDED RELEASE 24 HR ORAL
Qty: 60 TABLET | Refills: 0 | Status: SHIPPED | OUTPATIENT
Start: 2023-08-18 | End: 2023-09-07

## 2023-08-18 RX ORDER — LISINOPRIL 10 MG/1
10 TABLET ORAL DAILY
Qty: 30 TABLET | Refills: 0 | Status: SHIPPED | OUTPATIENT
Start: 2023-08-18 | End: 2023-09-07

## 2023-08-18 NOTE — TELEPHONE ENCOUNTER
Juliana willard, upcoming appt, please let pt know these have been sent in.     Leona Becerril MSN, RN

## 2023-08-21 ENCOUNTER — MYC REFILL (OUTPATIENT)
Dept: FAMILY MEDICINE | Facility: CLINIC | Age: 73
End: 2023-08-21
Payer: COMMERCIAL

## 2023-08-21 DIAGNOSIS — E11.9 TYPE 2 DIABETES MELLITUS WITHOUT COMPLICATION, WITHOUT LONG-TERM CURRENT USE OF INSULIN (H): ICD-10-CM

## 2023-08-22 RX ORDER — LISINOPRIL 10 MG/1
10 TABLET ORAL DAILY
Qty: 30 TABLET | Refills: 0 | OUTPATIENT
Start: 2023-08-22

## 2023-08-22 RX ORDER — METFORMIN HCL 500 MG
TABLET, EXTENDED RELEASE 24 HR ORAL
Qty: 60 TABLET | Refills: 0 | OUTPATIENT
Start: 2023-08-22

## 2023-09-07 ENCOUNTER — OFFICE VISIT (OUTPATIENT)
Dept: FAMILY MEDICINE | Facility: CLINIC | Age: 73
End: 2023-09-07
Payer: COMMERCIAL

## 2023-09-07 ENCOUNTER — LAB (OUTPATIENT)
Dept: LAB | Facility: CLINIC | Age: 73
End: 2023-09-07
Payer: COMMERCIAL

## 2023-09-07 ENCOUNTER — LAB (OUTPATIENT)
Dept: FAMILY MEDICINE | Facility: CLINIC | Age: 73
End: 2023-09-07

## 2023-09-07 VITALS
DIASTOLIC BLOOD PRESSURE: 85 MMHG | WEIGHT: 156.5 LBS | HEIGHT: 67 IN | RESPIRATION RATE: 16 BRPM | TEMPERATURE: 97.5 F | OXYGEN SATURATION: 98 % | BODY MASS INDEX: 24.56 KG/M2 | HEART RATE: 85 BPM | SYSTOLIC BLOOD PRESSURE: 131 MMHG

## 2023-09-07 DIAGNOSIS — Z12.11 SCREEN FOR COLON CANCER: ICD-10-CM

## 2023-09-07 DIAGNOSIS — E11.9 TYPE 2 DIABETES MELLITUS WITHOUT COMPLICATION (H): ICD-10-CM

## 2023-09-07 DIAGNOSIS — Z00.00 ENCOUNTER FOR MEDICARE ANNUAL WELLNESS EXAM: Primary | ICD-10-CM

## 2023-09-07 DIAGNOSIS — E11.9 TYPE 2 DIABETES MELLITUS WITHOUT COMPLICATION, WITHOUT LONG-TERM CURRENT USE OF INSULIN (H): ICD-10-CM

## 2023-09-07 DIAGNOSIS — Z12.31 VISIT FOR SCREENING MAMMOGRAM: ICD-10-CM

## 2023-09-07 DIAGNOSIS — Z78.0 ASYMPTOMATIC MENOPAUSAL STATE: ICD-10-CM

## 2023-09-07 LAB
ANION GAP SERPL CALCULATED.3IONS-SCNC: 9 MMOL/L (ref 7–15)
BUN SERPL-MCNC: 10.6 MG/DL (ref 8–23)
CALCIUM SERPL-MCNC: 9.5 MG/DL (ref 8.8–10.2)
CHLORIDE SERPL-SCNC: 104 MMOL/L (ref 98–107)
CHOLEST SERPL-MCNC: 155 MG/DL
CREAT SERPL-MCNC: 0.87 MG/DL (ref 0.51–0.95)
CREAT UR-MCNC: 55.7 MG/DL
DEPRECATED HCO3 PLAS-SCNC: 28 MMOL/L (ref 22–29)
EGFRCR SERPLBLD CKD-EPI 2021: 70 ML/MIN/1.73M2
GLUCOSE SERPL-MCNC: 166 MG/DL (ref 70–99)
HBA1C MFR BLD: 6.7 % (ref 0–5.6)
HDLC SERPL-MCNC: 36 MG/DL
LDLC SERPL CALC-MCNC: 64 MG/DL
MICROALBUMIN UR-MCNC: <12 MG/L
MICROALBUMIN/CREAT UR: NORMAL MG/G{CREAT}
NONHDLC SERPL-MCNC: 119 MG/DL
POTASSIUM SERPL-SCNC: 4.1 MMOL/L (ref 3.4–5.3)
SODIUM SERPL-SCNC: 141 MMOL/L (ref 136–145)
TRIGL SERPL-MCNC: 273 MG/DL

## 2023-09-07 PROCEDURE — 99207 PR FOOT EXAM NO CHARGE: CPT | Performed by: FAMILY MEDICINE

## 2023-09-07 PROCEDURE — 80048 BASIC METABOLIC PNL TOTAL CA: CPT

## 2023-09-07 PROCEDURE — 80061 LIPID PANEL: CPT

## 2023-09-07 PROCEDURE — 36415 COLL VENOUS BLD VENIPUNCTURE: CPT

## 2023-09-07 PROCEDURE — 82570 ASSAY OF URINE CREATININE: CPT

## 2023-09-07 PROCEDURE — 83036 HEMOGLOBIN GLYCOSYLATED A1C: CPT

## 2023-09-07 PROCEDURE — G0439 PPPS, SUBSEQ VISIT: HCPCS | Performed by: FAMILY MEDICINE

## 2023-09-07 PROCEDURE — 99213 OFFICE O/P EST LOW 20 MIN: CPT | Mod: 25 | Performed by: FAMILY MEDICINE

## 2023-09-07 PROCEDURE — 82043 UR ALBUMIN QUANTITATIVE: CPT

## 2023-09-07 RX ORDER — LISINOPRIL 10 MG/1
10 TABLET ORAL DAILY
Qty: 90 TABLET | Refills: 4 | Status: SHIPPED | OUTPATIENT
Start: 2023-09-07 | End: 2023-09-07

## 2023-09-07 RX ORDER — LISINOPRIL 10 MG/1
10 TABLET ORAL DAILY
Qty: 90 TABLET | Refills: 4 | Status: SHIPPED | OUTPATIENT
Start: 2023-09-07 | End: 2023-09-14

## 2023-09-07 RX ORDER — METFORMIN HCL 500 MG
TABLET, EXTENDED RELEASE 24 HR ORAL
Qty: 180 TABLET | Refills: 4 | Status: SHIPPED | OUTPATIENT
Start: 2023-09-07 | End: 2023-09-07

## 2023-09-07 RX ORDER — METFORMIN HCL 500 MG
TABLET, EXTENDED RELEASE 24 HR ORAL
Qty: 180 TABLET | Refills: 4 | Status: SHIPPED | OUTPATIENT
Start: 2023-09-07 | End: 2023-10-23

## 2023-09-07 ASSESSMENT — ENCOUNTER SYMPTOMS
EYE PAIN: 0
CHILLS: 0
SORE THROAT: 0
DIZZINESS: 0
FREQUENCY: 0
JOINT SWELLING: 0
HEARTBURN: 0
SHORTNESS OF BREATH: 0
FEVER: 0
BREAST MASS: 0
HEMATURIA: 0
MYALGIAS: 0
HEMATOCHEZIA: 0
ABDOMINAL PAIN: 0
DYSURIA: 0
NAUSEA: 0
COUGH: 0
WEAKNESS: 0
NERVOUS/ANXIOUS: 0
PALPITATIONS: 0
HEADACHES: 0
DIARRHEA: 0
ARTHRALGIAS: 0
PARESTHESIAS: 0
CONSTIPATION: 0

## 2023-09-07 ASSESSMENT — PAIN SCALES - GENERAL: PAINLEVEL: MODERATE PAIN (5)

## 2023-09-07 ASSESSMENT — ACTIVITIES OF DAILY LIVING (ADL): CURRENT_FUNCTION: NO ASSISTANCE NEEDED

## 2023-09-07 NOTE — PATIENT INSTRUCTIONS
"If you have questions about how to collect or ship the Cologuard test or need a new test kit shipped to you, please contact the Cologuard lab directly at [phone].     Patient Education   Personalized Prevention Plan  You are due for the preventive services outlined below.  Your care team is available to assist you in scheduling these services.  If you have already completed any of these items, please share that information with your care team to update in your medical record.  Health Maintenance Due   Topic Date Due    Osteoporosis Screening  Never done    Pneumococcal Vaccine (1 - PCV) Never done    Colorectal Cancer Screening  Never done    Zoster (Shingles) Vaccine (1 of 2) Never done    Mammogram  03/30/2007    COVID-19 Vaccine (3 - Pfizer series) 05/16/2021    Basic Metabolic Panel  07/23/2022    Cholesterol Lab  07/23/2022    Eye Exam  10/01/2022    Kidney Microalbumin Urine Test  08/10/2023    Flu Vaccine (1) 09/01/2023    Annual Wellness Visit  08/10/2023     Learning About Being Physically Active  What is physical activity?     Being physically active means doing any kind of activity that gets your body moving.  The types of physical activity that can help you get fit and stay healthy include:  Aerobic or \"cardio\" activities. These make your heart beat faster and make you breathe harder, such as brisk walking, riding a bike, or running. They strengthen your heart and lungs and build up your endurance.  Strength training activities. These make your muscles work against, or \"resist,\" something. Examples include lifting weights or doing push-ups. These activities help tone and strengthen your muscles and bones.  Stretches. These let you move your joints and muscles through their full range of motion. Stretching helps you be more flexible.  Reaching a balance between these three types of physical activity is important because each one contributes to your overall fitness.  What are the benefits of being " "active?  Being active is one of the best things you can do for your health. It helps you to:  Feel stronger and have more energy to do all the things you like to do.  Focus better at school or work.  Feel, think, and sleep better.  Reach and stay at a healthy weight.  Lose fat and build lean muscle.  Lower your risk for serious health problems, including diabetes, heart attack, high blood pressure, and some cancers.  Keep your heart, lungs, bones, muscles, and joints strong and healthy.  How can you make being active part of your life?  Start slowly. Make it your long-term goal to get at least 30 minutes of exercise on most days of the week. Walking is a good choice. You also may want to do other activities, such as running, swimming, cycling, or playing tennis or team sports.  Pick activities that you like--ones that make your heart beat faster, your muscles stronger, and your muscles and joints more flexible. If you find more than one thing you like doing, do them all. You don't have to do the same thing every day.  Get your heart pumping every day. Any activity that makes your heart beat faster and keeps it at that rate for a while counts.  Here are some great ways to get your heart beating faster:  Go for a brisk walk, run, or bike ride.  Go for a hike or swim.  Go in-line skating.  Play a game of touch football, basketball, or soccer.  Ride a bike.  Play tennis or racquetball.  Climb stairs.  Even some household chores can be aerobic--just do them at a faster pace. Vacuuming, raking or mowing the lawn, sweeping the garage, and washing and waxing the car all can help get your heart rate up.  Strengthen your muscles during the week. You don't have to lift heavy weights or grow big, bulky muscles to get stronger. Doing a few simple activities that make your muscles work against, or \"resist,\" something can help you get stronger.  For example, you can:  Do push-ups or sit-ups, which use your own body weight as " "resistance.  Lift weights or dumbbells or use stretch bands at home or in a gym or community center.  Stretch your muscles often. Stretching will help you as you become more active. It can help you stay flexible, loosen tight muscles, and avoid injury. It can also help improve your balance and posture and can be a great way to relax.  Be sure to stretch the muscles you'll be using when you work out. It's best to warm your muscles slightly before you stretch them. Walk or do some other light aerobic activity for a few minutes, and then start stretching.  When you stretch your muscles:  Do it slowly. Stretching is not about going fast or making sudden movements.  Don't push or bounce during a stretch.  Hold each stretch for at least 15 to 30 seconds, if you can. You should feel a stretch in the muscle, but not pain.  Breathe out as you do the stretch. Then breathe in as you hold the stretch. Don't hold your breath.  If you're worried about how more activity might affect your health, have a checkup before you start. Follow any special advice your doctor gives you for getting a smart start.  Where can you learn more?  Go to https://www.[email or web address].net/patiented  Enter W332 in the search box to learn more about \"Learning About Being Physically Active.\"  Current as of: October 10, 2022               Content Version: 13.7    3781-1895 [employer].   Care instructions adapted under license by your healthcare professional. If you have questions about a medical condition or this instruction, always ask your healthcare professional. [employer] disclaims any warranty or liability for your use of this information.      Bladder Training: Care Instructions  Your Care Instructions     Bladder training is used to treat urge incontinence and stress incontinence. Urge incontinence means that the need to urinate comes on so fast that you can't get to a toilet in time. Stress incontinence means that you leak " urine because of pressure on your bladder. For example, it may happen when you laugh, cough, or lift something heavy.  Bladder training can increase how long you can wait before you have to urinate. It can also help your bladder hold more urine. And it can give you better control over the urge to urinate.  It is important to remember that bladder training takes a few weeks to a few months to make a difference. You may not see results right away, but don't give up.  Follow-up care is a key part of your treatment and safety. Be sure to make and go to all appointments, and call your doctor if you are having problems. It's also a good idea to know your test results and keep a list of the medicines you take.  How can you care for yourself at home?  Work with your doctor to come up with a bladder training program that is right for you. You may use one or more of the following methods.  Delayed urination  In the beginning, try to keep from urinating for 5 minutes after you first feel the need to go.  While you wait, take deep, slow breaths to relax. Kegel exercises can also help you delay the need to go to the bathroom.  After some practice, when you can easily wait 5 minutes to urinate, try to wait 10 minutes before you urinate.  Slowly increase the waiting period until you are able to control when you have to urinate.  Scheduled urination  Empty your bladder when you first wake up in the morning.  Schedule times throughout the day when you will urinate.  Start by going to the bathroom every hour, even if you don't need to go.  Slowly increase the time between trips to the bathroom.  When you have found a schedule that works well for you, keep doing it.  If you wake up during the night and have to urinate, do it. Apply your schedule to waking hours only.  Kegel exercises  These tighten and strengthen pelvic muscles, which can help you control the flow of urine. (If doing these exercises causes pain, stop doing them and  "talk with your doctor.) To do Kegel exercises:  Squeeze your muscles as if you were trying not to pass gas. Or squeeze your muscles as if you were stopping the flow of urine. Your belly, legs, and buttocks shouldn't move.  Hold the squeeze for 3 seconds, then relax for 5 to 10 seconds.  Start with 3 seconds, then add 1 second each week until you are able to squeeze for 10 seconds.  Repeat the exercise 10 times a session. Do 3 to 8 sessions a day.  When should you call for help?  Watch closely for changes in your health, and be sure to contact your doctor if:    Your incontinence is getting worse.     You do not get better as expected.   Where can you learn more?  Go to https://www.[employer].net/patiented  Enter V684 in the search box to learn more about \"Bladder Training: Care Instructions.\"  Current as of: March 1, 2023               Content Version: 13.7    1555-8722 [employer].   Care instructions adapted under license by your healthcare professional. If you have questions about a medical condition or this instruction, always ask your healthcare professional. [employer] disclaims any warranty or liability for your use of this information.         "

## 2023-09-07 NOTE — PROGRESS NOTES
"She is at risk for lack of exercise and has been provided with information to increase physical activity for the benefit of her well-being.  Information on urinary incontinence and treatment options given to patient.Answers submitted by the patient for this visit:  Annual Preventive Visit (Submitted on 9/7/2023)  Chief Complaint: Annual Exam:  In general, how would you rate your overall physical health?: good  Frequency of exercise:: None  Do you usually eat at least 4 servings of fruit and vegetables a day, include whole grains & fiber, and avoid regularly eating high fat or \"junk\" foods? : Yes  Taking medications regularly:: Yes  Medication side effects:: Not applicable  Activities of Daily Living: no assistance needed  Home safety: no safety concerns identified  Hearing Impairment:: no hearing concerns  In the past 6 months, have you been bothered by leaking of urine?: Yes  abdominal pain: No  Blood in stool: No  Blood in urine: No  chest pain: No  chills: No  congestion: No  constipation: No  cough: No  diarrhea: No  dizziness: No  ear pain: No  eye pain: No  nervous/anxious: No  fever: No  frequency: No  genital sores: No  headaches: No  hearing loss: No  heartburn: No  arthralgias: No  joint swelling: No  peripheral edema: No  mood changes: No  myalgias: No  nausea: No  dysuria: No  palpitations: No  Skin sensation changes: No  sore throat: No  urgency: No  rash: No  shortness of breath: No  visual disturbance: No  weakness: No  pelvic pain: No  vaginal bleeding: No  vaginal discharge: No  tenderness: No  breast mass: No  breast discharge: No  In general, how would you rate your overall mental or emotional health?: excellent  Additional concerns today:: No    "

## 2023-09-07 NOTE — PROGRESS NOTES
"SUBJECTIVE:   Sigrid is a 73 year old who presents for Preventive Visit.      9/7/2023    10:51 AM   Additional Questions   Roomed by Lakisha garces   Accompanied by self       Are you in the first 12 months of your Medicare coverage?  No    Healthy Habits:     In general, how would you rate your overall health?  Good    Frequency of exercise:  None    Do you usually eat at least 4 servings of fruit and vegetables a day, include whole grains    & fiber and avoid regularly eating high fat or \"junk\" foods?  Yes    Taking medications regularly:  Yes    Medication side effects:  Not applicable    Ability to successfully perform activities of daily living:  No assistance needed    Home Safety:  No safety concerns identified    Hearing Impairment:  No hearing concerns    In the past 6 months, have you been bothered by leaking of urine? Yes    In general, how would you rate your overall mental or emotional health?  Excellent    Additional concerns today:  No        Have you ever done Advance Care Planning? (For example, a Health Directive, POLST, or a discussion with a medical provider or your loved ones about your wishes): Yes, patient states has an Advance Care Planning document and will bring a copy to the clinic.       Fall risk  Fallen 2 or more times in the past year?: No  Any fall with injury in the past year?: No    Cognitive Screening   1) Repeat 3 items (Leader, Season, Table)    2) Clock draw: NORMAL  3) 3 item recall: Recalls 3 objects  Results: NORMAL clock, 1-2 items recalled: COGNITIVE IMPAIRMENT LESS LIKELY    Mini-CogTM Copyright CARMENZA Ross. Licensed by the author for use in [facility]; reprinted with permission (jennifer@.[facility]). All rights reserved.      Do you have sleep apnea, excessive snoring or daytime drowsiness? : no    Reviewed and updated as needed this visit by clinical staff   Tobacco  Allergies  Meds              Reviewed and updated as needed this visit by Provider                 Social " History     Tobacco Use     Smoking status: Former     Types: Cigarettes     Quit date: 1978     Years since quittin.7     Smokeless tobacco: Never     Tobacco comments:     quit    Substance Use Topics     Alcohol use: No             2023    10:17 AM   Alcohol Use   Prescreen: >3 drinks/day or >7 drinks/week? Not Applicable     Do you have a current opioid prescription? No  Do you use any other controlled substances or medications that are not prescribed by a provider? None              Current providers sharing in care for this patient include:   Patient Care Team:  Idalia Gage MD as PCP - General (Family Practice)  Pawan Cannon MD as MD (Dermatology)  Idalia Gage MD as Assigned PCP    The following health maintenance items are reviewed in Epic and correct as of today:  Health Maintenance   Topic Date Due     DEXA  Never done     Pneumococcal Vaccine: 65+ Years (1 - PCV) Never done     COLORECTAL CANCER SCREENING  Never done     ZOSTER IMMUNIZATION (1 of 2) Never done     LUNG CANCER SCREENING  Never done     MAMMO SCREENING  2007     COVID-19 Vaccine (3 - Pfizer series) 2021     BMP  2022     LIPID  2022     EYE EXAM  10/01/2022     MICROALBUMIN  08/10/2023     DIABETIC FOOT EXAM  08/10/2023     ANNUAL REVIEW OF HM ORDERS  08/10/2023     INFLUENZA VACCINE (1) 2023     MEDICARE ANNUAL WELLNESS VISIT  08/10/2023     A1C  2024     FALL RISK ASSESSMENT  2024     DTAP/TDAP/TD IMMUNIZATION (2 - Td or Tdap) 2026     ADVANCE CARE PLANNING  08/10/2027     HEPATITIS C SCREENING  Completed     PHQ-2 (once per calendar year)  Completed     IPV IMMUNIZATION  Aged Out     HPV IMMUNIZATION  Aged Out     MENINGITIS IMMUNIZATION  Aged Out     Labs reviewed in EPIC  Patient Active Problem List   Diagnosis     Esophageal reflux     CARDIOVASCULAR SCREENING; LDL GOAL LESS THAN 160     Advanced directives, counseling/discussion      Type 2 diabetes mellitus without complication (H)     Elevated blood pressure reading without diagnosis of hypertension     Past Surgical History:   Procedure Laterality Date     lumbar disc surgery         Social History     Tobacco Use     Smoking status: Former     Types: Cigarettes     Quit date: 1978     Years since quittin.7     Smokeless tobacco: Never     Tobacco comments:     quit    Substance Use Topics     Alcohol use: No     Family History   Problem Relation Age of Onset     Hypertension Mother      Cardiovascular Mother         CHF     Diabetes Mother      Lung Cancer Father         smoker     Cancer Daughter         brain tumor     Thyroid Disease No family hx of          Current Outpatient Medications   Medication Sig Dispense Refill     aspirin 81 MG EC tablet Take 1 tablet (81 mg) by mouth daily 90 tablet 3     CLARITIN 10 MG OR TABS 1 TABLET DAILY prn       fish oil-omega-3 fatty acids 1000 MG capsule Take by mouth daily       metFORMIN (GLUCOPHAGE XR) 500 MG 24 hr tablet TAKE 2 TABLETS BY MOUTH ONCE DAILY WITH BREAKFAST 180 tablet 4     simethicone (MYLICON) 80 MG chewable tablet Take 1 tablet (80 mg) by mouth every 6 hours as needed for flatulence or cramping 120 tablet 11     Turmeric 500 MG CAPS        blood glucose monitoring (NO BRAND SPECIFIED) test strip Use to test blood sugars 2-3 times daily or as directed. ReliOn Prime Glucose test strips, what ever pt wants/Insurance will cover. (Patient not taking: Reported on 2023) 200 each 5     Blood Glucose Monitoring Suppl (RELION CONFIRM GLUCOSE MONITOR) W/DEVICE KIT 1 Device 3 times daily (before meals) (Patient not taking: Reported on 2023) 1 kit 1     lisinopril (ZESTRIL) 10 MG tablet Take 1 tablet (10 mg) by mouth daily 30 tablet 0     RELION LANCETS THIN 26G MISC 1 each 3 times daily (Patient not taking: Reported on 2023) 300 each 1     STATIN NOT PRESCRIBED (INTENTIONAL) Please choose reason not prescribed,  "below (Patient not taking: Reported on 9/7/2023)       No Known Allergies  Mammogram Screening: Mammogram Screening: Recommended mammography every 1-2 years with patient discussion and risk factor consideration        Review of Systems   Constitutional:  Negative for chills and fever.   HENT:  Negative for congestion, ear pain, hearing loss and sore throat.    Eyes:  Negative for pain and visual disturbance.   Respiratory:  Negative for cough and shortness of breath.    Cardiovascular:  Negative for chest pain, palpitations and peripheral edema.   Gastrointestinal:  Negative for abdominal pain, constipation, diarrhea, heartburn, hematochezia and nausea.   Breasts:  Negative for tenderness, breast mass and discharge.   Genitourinary:  Negative for dysuria, frequency, genital sores, hematuria, pelvic pain, urgency, vaginal bleeding and vaginal discharge.   Musculoskeletal:  Negative for arthralgias, joint swelling and myalgias.   Skin:  Negative for rash.   Neurological:  Negative for dizziness, weakness, headaches and paresthesias.   Psychiatric/Behavioral:  Negative for mood changes. The patient is not nervous/anxious.          OBJECTIVE:   /85 (BP Location: Right arm, Patient Position: Sitting, Cuff Size: Adult Large)   Pulse 85   Temp 97.5  F (36.4  C) (Tympanic)   Resp 16   Ht 1.7 m (5' 6.93\")   Wt 71 kg (156 lb 8 oz)   SpO2 98%   BMI 24.56 kg/m   Estimated body mass index is 24.56 kg/m  as calculated from the following:    Height as of this encounter: 1.7 m (5' 6.93\").    Weight as of this encounter: 71 kg (156 lb 8 oz).  Physical Exam  GENERAL APPEARANCE: healthy, alert and no distress  EYES: Eyes grossly normal to inspection, PERRL and conjunctivae and sclerae normal  HENT: ear canals and TM's normal  NECK: no adenopathy, no asymmetry, masses, or scars and thyroid normal to palpation  RESP: lungs clear to auscultation - no rales, rhonchi or wheezes  BREAST: normal without masses, tenderness or " nipple discharge and no palpable axillary masses or adenopathy  CV: regular rate and rhythm, normal S1 S2, no S3 or S4, no murmur, click or rub, no peripheral edema and peripheral pulses strong  ABDOMEN: soft, nontender, no hepatosplenomegaly, no masses and bowel sounds normal  MS: no musculoskeletal defects are noted and gait is age appropriate without ataxia  SKIN: no suspicious lesions or rashes  NEURO: Normal strength and tone, sensory exam grossly normal, mentation intact and speech normal  PSYCH: mentation appears normal and affect normal/bright    Diagnostic Test Results:  Labs reviewed in Epic    Lab Results   Component Value Date    A1C 6.7 09/07/2023    A1C 6.9 08/10/2022    A1C 7.2 07/23/2021    A1C 7.1 12/07/2018    A1C 7.2 11/08/2017    A1C 6.4 06/13/2016    A1C 13.4 02/02/2016      ASSESSMENT / PLAN:   Encounter for Medicare annual wellness exam    Type 2 diabetes mellitus without complication, without long-term current use of insulin (H)  Well controlled. Refilled medication.     - metFORMIN (GLUCOPHAGE XR) 500 MG 24 hr tablet; TAKE 2 TABLETS BY MOUTH ONCE DAILY WITH BREAKFAST  - FOOT EXAM    Asymptomatic menopausal state  - DX Hip/Pelvis/Spine; Future    Screen for colon cancer  - COLOGUARD(EXACT SCIENCES); Future    Visit for screening mammogram  - MA SCREENING DIGITAL BILAT - Future  (s+30); Future        Patient has been advised of split billing requirements and indicates understanding: Yes      COUNSELING:  Reviewed preventive health counseling, as reflected in patient instructions        She reports that she quit smoking about 45 years ago. Her smoking use included cigarettes. She has never used smokeless tobacco.      Appropriate preventive services were discussed with this patient, including applicable screening as appropriate for cardiovascular disease, diabetes, osteopenia/osteoporosis, and glaucoma.  As appropriate for age/gender, discussed screening for colorectal cancer, prostate cancer,  breast cancer, and cervical cancer. Checklist reviewing preventive services available has been given to the patient.    Reviewed patients plan of care and provided an AVS. The Intermediate Care Plan ( asthma action plan, low back pain action plan, and migraine action plan) for Sigrid meets the Care Plan requirement. This Care Plan has been established and reviewed with the Patient.          Idalia Gage MD  [facility]    Identified Health Risks:

## 2023-09-10 ENCOUNTER — HEALTH MAINTENANCE LETTER (OUTPATIENT)
Age: 73
End: 2023-09-10

## 2023-09-12 ENCOUNTER — E-VISIT (OUTPATIENT)
Dept: URGENT CARE | Facility: CLINIC | Age: 73
End: 2023-09-12
Payer: COMMERCIAL

## 2023-09-12 DIAGNOSIS — N39.0 ACUTE UTI (URINARY TRACT INFECTION): Primary | ICD-10-CM

## 2023-09-12 PROCEDURE — 99421 OL DIG E/M SVC 5-10 MIN: CPT | Performed by: EMERGENCY MEDICINE

## 2023-09-12 RX ORDER — NITROFURANTOIN 25; 75 MG/1; MG/1
100 CAPSULE ORAL 2 TIMES DAILY
Qty: 10 CAPSULE | Refills: 0 | Status: SHIPPED | OUTPATIENT
Start: 2023-09-12 | End: 2023-09-17

## 2023-09-12 NOTE — PATIENT INSTRUCTIONS
Dear Sigrid Mcneill    After reviewing your responses, I've been able to diagnose you with a urinary tract infection, which is a common infection of the bladder with bacteria.  This is not a sexually transmitted infection, though urinating immediately after intercourse can help prevent infections.  Drinking lots of fluids is also helpful to clear your current infection and prevent the next one.      I have sent a prescription for antibiotics to your pharmacy to treat this infection.    It is important that you take all of your prescribed medication even if your symptoms are improving after a few doses.  Taking all of your medicine helps prevent the symptoms from returning.     If your symptoms worsen, you develop pain in your back or stomach, develop fevers, or are not improving in 5 days, please contact your primary care provider for an appointment or visit any of our convenient Walk-in or Urgent Care Centers to be seen, which can be found on our website here.    Thanks again for choosing us as your health care partner,    Manuel Presley MD

## 2023-09-14 DIAGNOSIS — E11.9 TYPE 2 DIABETES MELLITUS WITHOUT COMPLICATION, WITHOUT LONG-TERM CURRENT USE OF INSULIN (H): ICD-10-CM

## 2023-09-14 RX ORDER — LISINOPRIL 10 MG/1
10 TABLET ORAL DAILY
Qty: 30 TABLET | Refills: 0 | Status: SHIPPED | OUTPATIENT
Start: 2023-09-14 | End: 2023-10-23

## 2023-09-14 NOTE — TELEPHONE ENCOUNTER
Pt asking for 30-day Rx for Lisinopril to get her through until she leaves for [place].  No need to call patient back, unless there are questions or problems.

## 2023-09-20 PROBLEM — R03.0 ELEVATED BLOOD PRESSURE READING WITHOUT DIAGNOSIS OF HYPERTENSION: Status: RESOLVED | Noted: 2017-11-08 | Resolved: 2023-09-20

## 2023-10-02 ENCOUNTER — TRANSFERRED RECORDS (OUTPATIENT)
Dept: HEALTH INFORMATION MANAGEMENT | Facility: CLINIC | Age: 73
End: 2023-10-02
Payer: COMMERCIAL

## 2023-10-02 LAB — RETINOPATHY: NEGATIVE

## 2023-10-23 DIAGNOSIS — E11.9 TYPE 2 DIABETES MELLITUS WITHOUT COMPLICATION, WITHOUT LONG-TERM CURRENT USE OF INSULIN (H): ICD-10-CM

## 2023-10-23 RX ORDER — METFORMIN HCL 500 MG
TABLET, EXTENDED RELEASE 24 HR ORAL
Qty: 180 TABLET | Refills: 1 | Status: SHIPPED | OUTPATIENT
Start: 2023-10-23 | End: 2024-07-23

## 2023-10-23 RX ORDER — LISINOPRIL 10 MG/1
10 TABLET ORAL DAILY
Qty: 90 TABLET | Refills: 1 | Status: SHIPPED | OUTPATIENT
Start: 2023-10-23 | End: 2024-07-23

## 2023-10-23 NOTE — TELEPHONE ENCOUNTER
Pt needs refills sent to [place] pharmacy - Pt states that they were transferred back up here, so no refills remain at [facility] in [place], so new Rxs are now needed for both meds.

## 2023-11-19 ENCOUNTER — HEALTH MAINTENANCE LETTER (OUTPATIENT)
Age: 73
End: 2023-11-19

## 2023-12-08 ENCOUNTER — MYC MEDICAL ADVICE (OUTPATIENT)
Dept: FAMILY MEDICINE | Facility: CLINIC | Age: 73
End: 2023-12-08
Payer: COMMERCIAL

## 2023-12-14 NOTE — TELEPHONE ENCOUNTER
Patient Quality Outreach    Patient is due for the following:   Colon Cancer Screening    Next Steps:   Patient is due for colon cancer screening.    Type of outreach:    Patient needs colon cancer screening      Questions for provider review:    None           Gloria Mosquera

## 2024-02-01 ENCOUNTER — MYC MEDICAL ADVICE (OUTPATIENT)
Dept: FAMILY MEDICINE | Facility: CLINIC | Age: 74
End: 2024-02-01
Payer: COMMERCIAL

## 2024-02-01 NOTE — TELEPHONE ENCOUNTER
Patient Quality Outreach    Patient is due for the following:   Breast Cancer Screening - Mammogram    Next Steps:   No follow up needed at this time.    Type of outreach:    Chart review performed, no outreach needed.      Questions for provider review:    None           Gloria Mosquera

## 2024-04-07 ENCOUNTER — HEALTH MAINTENANCE LETTER (OUTPATIENT)
Age: 74
End: 2024-04-07

## 2024-04-30 ENCOUNTER — NURSE TRIAGE (OUTPATIENT)
Dept: FAMILY MEDICINE | Facility: CLINIC | Age: 74
End: 2024-04-30
Payer: COMMERCIAL

## 2024-04-30 NOTE — TELEPHONE ENCOUNTER
Patient called the clinic reporting worsening throat hoarseness in the last 2 months. Increased tightness in the throat when exercising. Breathing is good, speaks in sentences, chest feels ok, no chest pains. Able to eat and drink well. Lisinopril form/brand changed at the pharmacy in October. Dry cough and hoarseness has been worsening int the last 2 months. Pt advised virtual or in person OV in the next 1-2 days.     Pt transferred to a nurse in [place] who assisted with scheduling an appointment.     Appointments in Next Year      May 01, 2024 11:40 AM  (Arrive by 11:20 AM)  Provider Visit with MICHELLE Boles CNP  [facility] ([facility] ) [phone]     Will send FYI to PCP.     Reason for Disposition   Hoarseness starting > 24 hours ago AND taking an ACE Inhibitor medicine (e.g., benazepril/LOTENSIN, captopril/CAPOTEN, enalapril/VASOTEC, lisinopril/ZESTRIL)    Additional Information   Negative: Recovered from choking episode and hoarseness lasts > 30 minutes   Negative: Direct blow to front of neck   Negative: Hoarseness starting in past 24 hours AND taking an ACE Inhibitor medicine (e.g., benazepril/LOTENSIN, captopril/CAPOTEN, enalapril/VASOTEC, lisinopril/ZESTRIL)   Negative: Difficulty breathing   Negative: Patient sounds very sick or weak to the triager   Negative: Fever > 103 F (39.4 C)   Negative: SEVERE sore throat pain   Negative: Fever present > 3 days (72 hours)    Protocols used: [id number]

## 2024-05-01 ENCOUNTER — OFFICE VISIT (OUTPATIENT)
Dept: FAMILY MEDICINE | Facility: CLINIC | Age: 74
End: 2024-05-01
Payer: COMMERCIAL

## 2024-05-01 VITALS
DIASTOLIC BLOOD PRESSURE: 80 MMHG | BODY MASS INDEX: 25.11 KG/M2 | HEART RATE: 74 BPM | RESPIRATION RATE: 16 BRPM | HEIGHT: 67 IN | SYSTOLIC BLOOD PRESSURE: 130 MMHG | OXYGEN SATURATION: 97 % | TEMPERATURE: 95.7 F | WEIGHT: 160 LBS

## 2024-05-01 DIAGNOSIS — R05.3 CHRONIC COUGH: ICD-10-CM

## 2024-05-01 DIAGNOSIS — I10 BENIGN ESSENTIAL HYPERTENSION: Primary | ICD-10-CM

## 2024-05-01 PROCEDURE — 99214 OFFICE O/P EST MOD 30 MIN: CPT | Performed by: NURSE PRACTITIONER

## 2024-05-01 RX ORDER — RESPIRATORY SYNCYTIAL VIRUS VACCINE 120MCG/0.5
0.5 KIT INTRAMUSCULAR ONCE
Qty: 1 EACH | Refills: 0 | Status: CANCELLED | OUTPATIENT
Start: 2024-05-01 | End: 2024-05-01

## 2024-05-01 RX ORDER — LOSARTAN POTASSIUM 25 MG/1
25 TABLET ORAL DAILY
Qty: 90 TABLET | Refills: 1 | Status: SHIPPED | OUTPATIENT
Start: 2024-05-01 | End: 2024-07-23

## 2024-05-01 ASSESSMENT — PAIN SCALES - GENERAL: PAINLEVEL: NO PAIN (0)

## 2024-05-01 NOTE — PROGRESS NOTES
"  Assessment & Plan     Benign essential hypertension    - losartan (COZAAR) 25 MG tablet; Take 1 tablet (25 mg) by mouth daily  Discussed how to take the medication(s), expected outcomes, potential side effects.    Chronic cough    - losartan (COZAAR) 25 MG tablet; Take 1 tablet (25 mg) by mouth daily    She really felt this clearing of her throat irritation was based upon a different lisinopril medication , I tried to reassure her it is the same medication without success.  Will go ahead and have her stop Lisinopril and try Losartan.  She has blood pressure cuff at home and will monitor pressure and symptoms. She lives in [place] so it's difficult to get here for RN recheck of pressure.   She has her yearly office visit scheduled in September.          BMI  Estimated body mass index is 25.25 kg/m  as calculated from the following:    Height as of this encounter: 1.695 m (5' 6.75\").    Weight as of this encounter: 72.6 kg (160 lb).         See Patient Instructions  Patient Instructions   Stop the Lisinopril and try the Losartan.  Continue to monitor blood pressure at home, notify if readings are high.  Follow up as scheduled with Dr. Gage.  May need to follow up sooner if this medication change does not alleviate your concerns with clearing of the throat.      Our Clinic hours are:  Mondays    7:20 am - 7 pm  Tues -  Fri  7:20 am - 5 pm    Clinic Phone: [phone]    The clinic lab opens at 7:30 am Mon - Fri and appointments are required.    [facility]. [phone]  Monday  8 am - 7pm  Tues - Fri 8 am - 5:30 pm         Jony Matta is a 73 year old, presenting for the following health issues:  dry cough  (Feels like she has something in her throat. )      5/1/2024    11:35 AM   Additional Questions   Roomed by      History of Present Illness       Reason for visit:  Throat  Symptom onset:  More than a month    She eats 2-3 servings of fruits and vegetables " "daily.She consumes 0 sweetened beverage(s) daily.She exercises with enough effort to increase her heart rate 60 or more minutes per day.  She exercises with enough effort to increase her heart rate 4 days per week.   She is taking medications regularly.       She describes all winter long having this irritating cough, states it's like she has to clear her throat all the time. Denies difficulty swallowing. Denies feeling poorly. Denies difficulty breathing.  She is on Claritin daily and a PPI. At end of visit she brings out a bottle of her recent refill of Lisinopril and shows me the pills which are \"twice as big\" as previous ones and are pink in color.              Review of Systems  Constitutional, HEENT, cardiovascular, pulmonary, gi and gu systems are negative, except as otherwise noted.      Objective    /80   Pulse 74   Temp (!) 95.7  F (35.4  C)   Resp 16   Ht 1.695 m (5' 6.75\")   Wt 72.6 kg (160 lb)   SpO2 97%   BMI 25.25 kg/m    Body mass index is 25.25 kg/m .  Physical Exam   GENERAL: healthy, alert and no distress  HENT: ear canals and TM's normal, pharynx without erythema, no sinus tenderness  NECK: no adenopathy, no asymmetry  RESP: lungs clear to auscultation - no rales, rhonchi or wheezes  CV: regular rate and rhythm, normal S1 S2, no S3 or S4, no murmur  MS: no gross musculoskeletal defects noted              Signed Electronically by: MICHELLE Boles CNP    "

## 2024-05-01 NOTE — PATIENT INSTRUCTIONS
Stop the Lisinopril and try the Losartan.  Continue to monitor blood pressure at home, notify if readings are high.  Follow up as scheduled with Dr. Gage.  May need to follow up sooner if this medication change does not alleviate your concerns with clearing of the throat.      Our Clinic hours are:  Mondays    7:20 am - 7 pm  Tues -  Fri  7:20 am - 5 pm    Clinic Phone: [phone]    The clinic lab opens at 7:30 am Mon - Fri and appointments are required.    [facility] [place]  Ph. [phone]  Monday  8 am - 7pm  Tues - Fri 8 am - 5:30 pm       
No

## 2024-06-30 ENCOUNTER — PATIENT OUTREACH (OUTPATIENT)
Dept: CARE COORDINATION | Facility: CLINIC | Age: 74
End: 2024-06-30
Payer: COMMERCIAL

## 2024-07-01 ENCOUNTER — MYC MEDICAL ADVICE (OUTPATIENT)
Dept: FAMILY MEDICINE | Facility: CLINIC | Age: 74
End: 2024-07-01
Payer: COMMERCIAL

## 2024-07-01 DIAGNOSIS — R05.3 CHRONIC COUGH: Primary | ICD-10-CM

## 2024-07-01 DIAGNOSIS — J32.9 CHRONIC CONGESTION OF PARANASAL SINUS: ICD-10-CM

## 2024-07-12 NOTE — PROGRESS NOTES
Sigrid Mcneill is a 73 year old female  Chief Complaint: Chronic cough at least 2 months, choking sensation in the throat, hoarseness. Hx of GERD, diagnosed in 2006. On omeprazole 20 mg in am  On Lisinopril was off and no difference  History of Present Illness  Location: throat  Quality: fullness  Severity:mild  Duration: 2 monthe    Past Medical History -   Patient Active Problem List   Diagnosis    Type 2 diabetes mellitus without complication (H)       Current Medications -   Current Outpatient Medications:     aspirin 81 MG EC tablet, Take 1 tablet (81 mg) by mouth daily, Disp: 90 tablet, Rfl: 3    blood glucose monitoring (NO BRAND SPECIFIED) test strip, Use to test blood sugars 2-3 times daily or as directed. ReliOn Prime Glucose test strips, what ever pt wants/Insurance will cover. (Patient not taking: Reported on 9/7/2023), Disp: 200 each, Rfl: 5    Blood Glucose Monitoring Suppl (RELION CONFIRM GLUCOSE MONITOR) W/DEVICE KIT, 1 Device 3 times daily (before meals) (Patient not taking: Reported on 9/7/2023), Disp: 1 kit, Rfl: 1    CLARITIN 10 MG OR TABS, 1 TABLET DAILY prn, Disp: , Rfl:     fish oil-omega-3 fatty acids 1000 MG capsule, Take by mouth daily, Disp: , Rfl:     lisinopril (ZESTRIL) 10 MG tablet, Take 1 tablet (10 mg) by mouth daily, Disp: 90 tablet, Rfl: 1    losartan (COZAAR) 25 MG tablet, Take 1 tablet (25 mg) by mouth daily, Disp: 90 tablet, Rfl: 1    metFORMIN (GLUCOPHAGE XR) 500 MG 24 hr tablet, TAKE 2 TABLETS BY MOUTH ONCE DAILY WITH BREAKFAST, Disp: 180 tablet, Rfl: 1    RELION LANCETS THIN 26G MISC, 1 each 3 times daily (Patient not taking: Reported on 5/1/2024), Disp: 300 each, Rfl: 1    simethicone (MYLICON) 80 MG chewable tablet, Take 1 tablet (80 mg) by mouth every 6 hours as needed for flatulence or cramping, Disp: 120 tablet, Rfl: 11    STATIN NOT PRESCRIBED (INTENTIONAL), Please choose reason not prescribed, below (Patient not taking: Reported on 9/7/2023), Disp: , Rfl:      "Turmeric 500 MG CAPS, , Disp: , Rfl:     Allergies - No Known Allergies    Social History -   Social History     Socioeconomic History    Marital status:    Tobacco Use    Smoking status: Former     Current packs/day: 0.00     Types: Cigarettes     Quit date: 1978     Years since quittin.5    Smokeless tobacco: Never    Tobacco comments:     quit    Vaping Use    Vaping status: Never Used   Substance and Sexual Activity    Alcohol use: No    Drug use: No    Sexual activity: Not Currently   Other Topics Concern    Parent/sibling w/ CABG, MI or angioplasty before 65F 55M? No     Social Determinants of Health      [facility]    Social Connections   Interpersonal Safety: Low Risk  (2024)    Interpersonal Safety     Do you feel physically and emotionally safe where you currently live?: Yes     Within the past 12 months, have you been hit, slapped, kicked or otherwise physically hurt by someone?: No     Within the past 12 months, have you been humiliated or emotionally abused in other ways by your partner or ex-partner?: No       Family History -   Family History   Problem Relation Age of Onset    Hypertension Mother     Cardiovascular Mother         CHF    Diabetes Mother     Lung Cancer Father         smoker    Cancer Daughter         brain tumor    Thyroid Disease No family hx of        Review of Systems:   !.  Weight Loss: No   2. Difficulty Breathing: No   3. Difficulty Swallowing: No   4. Pain: No    Physical Exam  B/P: Data Unavailable, T: Data Unavailable, P: Data Unavailable, R: Data Unavailable  Vitals: BP (!) 150/93 (BP Location: Right arm)   Pulse 68   Temp 96.9  F (36.1  C) (Tympanic)   Resp 14   Ht 1.695 m (5' 6.75\")   Wt 73.8 kg (162 lb 12.8 oz)   SpO2 97%   BMI 25.69 kg/m    BMI= There is no height or weight on file to calculate BMI.    General  Appearance - " Normal  Head/Face/Scalp:    Skin - Normal    Facial Palpation - Normal    Facial Strength - Normal  Ears:    Pinna - Normal    Canal - Normal   Tympanic membrane - Normal  Nose:    External - Normal    Septum - Normal    Turbinates - Normal    Middle meatus - Normal  Oral Cavity:    Lips - Normal    Floor of Mouth - Normal    Gingiva - Normal    Tongue - Normal    Buccal - Normal    Palate - Normal  Nasopharynx:    Oropharynx:    Tonsils - Normal    Tongue base - Normal    Soft palate - Normal    Posterior pharyngeal wall - Normal  Hypopharynx:  Larynx:    Epiglottis - t    Aryepiglottic folds - normal    Arytenoids - edema, erythema, pos-rcicoid edema    False vocal cords - normal    True vocal cords -noral  Neck Masses - No  Neck lymphatics - no lymphadenopathy  Thyroid - Normal  Salivary glands - Normal    Audiogram - not applicable  Radiology - not applicable   Reports:   View films:  Procedures     Indications for flexible endoscopy were discussed with the patient. Verbal consent was obtained. Topical anesthesia of the nasal mucosa was obtained with a mixture of lidocaine with neosynephrine. Flexible tykz-uwkriqmg-yfczdrxtevel was then performed.  Patient Education:     A/P - Sigrid DEBI Osito is a 73 year old female  Medical Decision Making: cough, sensation of lump in throat x 2 months  LPR changes on endoscopy  Increase dose if Omeprazole to 40 mg and move to pm

## 2024-07-17 ENCOUNTER — OFFICE VISIT (OUTPATIENT)
Dept: OTOLARYNGOLOGY | Facility: CLINIC | Age: 74
End: 2024-07-17
Attending: NURSE PRACTITIONER
Payer: COMMERCIAL

## 2024-07-17 VITALS
SYSTOLIC BLOOD PRESSURE: 150 MMHG | TEMPERATURE: 96.9 F | WEIGHT: 162.8 LBS | RESPIRATION RATE: 14 BRPM | OXYGEN SATURATION: 97 % | DIASTOLIC BLOOD PRESSURE: 93 MMHG | BODY MASS INDEX: 25.55 KG/M2 | HEART RATE: 68 BPM | HEIGHT: 67 IN

## 2024-07-17 DIAGNOSIS — J32.9 CHRONIC CONGESTION OF PARANASAL SINUS: ICD-10-CM

## 2024-07-17 DIAGNOSIS — R05.3 CHRONIC COUGH: ICD-10-CM

## 2024-07-17 DIAGNOSIS — K21.9 GASTROESOPHAGEAL REFLUX DISEASE WITHOUT ESOPHAGITIS: Primary | ICD-10-CM

## 2024-07-17 PROCEDURE — 31575 DIAGNOSTIC LARYNGOSCOPY: CPT | Performed by: OTOLARYNGOLOGY

## 2024-07-17 PROCEDURE — 99204 OFFICE O/P NEW MOD 45 MIN: CPT | Mod: 25 | Performed by: OTOLARYNGOLOGY

## 2024-07-17 RX ORDER — OMEPRAZOLE 40 MG/1
40 CAPSULE, DELAYED RELEASE ORAL DAILY
Qty: 90 CAPSULE | Refills: 1 | Status: SHIPPED | OUTPATIENT
Start: 2024-07-17

## 2024-07-17 RX ORDER — OLIVE OIL
OIL (ML) MISCELLANEOUS DAILY
COMMUNITY

## 2024-07-17 ASSESSMENT — PAIN SCALES - GENERAL: PAINLEVEL: NO PAIN (0)

## 2024-07-17 NOTE — LETTER
7/17/2024      Sigrid Mcneill  [address] [address] [id number]      Dear Colleague,    Thank you for referring your patient, Sirgid Mcneill, to the [facility]. Please see a copy of my visit note below.    Sigrid Mcneill is a 73 year old female  Chief Complaint: Chronic cough at least 2 months, choking sensation in the throat, hoarseness. Hx of GERD, diagnosed in 2006. On omeprazole 20 mg in am  On Lisinopril was off and no difference  History of Present Illness  Location: throat  Quality: fullness  Severity:mild  Duration: 2 monthe    Past Medical History -   Patient Active Problem List   Diagnosis     Type 2 diabetes mellitus without complication (H)       Current Medications -   Current Outpatient Medications:      aspirin 81 MG EC tablet, Take 1 tablet (81 mg) by mouth daily, Disp: 90 tablet, Rfl: 3     blood glucose monitoring (NO BRAND SPECIFIED) test strip, Use to test blood sugars 2-3 times daily or as directed. ReliOn Prime Glucose test strips, what ever pt wants/Insurance will cover. (Patient not taking: Reported on 9/7/2023), Disp: 200 each, Rfl: 5     Blood Glucose Monitoring Suppl (RELION CONFIRM GLUCOSE MONITOR) W/DEVICE KIT, 1 Device 3 times daily (before meals) (Patient not taking: Reported on 9/7/2023), Disp: 1 kit, Rfl: 1     CLARITIN 10 MG OR TABS, 1 TABLET DAILY prn, Disp: , Rfl:      fish oil-omega-3 fatty acids 1000 MG capsule, Take by mouth daily, Disp: , Rfl:      lisinopril (ZESTRIL) 10 MG tablet, Take 1 tablet (10 mg) by mouth daily, Disp: 90 tablet, Rfl: 1     losartan (COZAAR) 25 MG tablet, Take 1 tablet (25 mg) by mouth daily, Disp: 90 tablet, Rfl: 1     metFORMIN (GLUCOPHAGE XR) 500 MG 24 hr tablet, TAKE 2 TABLETS BY MOUTH ONCE DAILY WITH BREAKFAST, Disp: 180 tablet, Rfl: 1     RELION LANCETS THIN 26G MISC, 1 each 3 times daily (Patient not taking: Reported on 5/1/2024), Disp: 300 each, Rfl: 1     simethicone (MYLICON) 80 MG chewable tablet,  Take 1 tablet (80 mg) by mouth every 6 hours as needed for flatulence or cramping, Disp: 120 tablet, Rfl: 11     STATIN NOT PRESCRIBED (INTENTIONAL), Please choose reason not prescribed, below (Patient not taking: Reported on 2023), Disp: , Rfl:      Turmeric 500 MG CAPS, , Disp: , Rfl:     Allergies - No Known Allergies    Social History -   Social History     Socioeconomic History     Marital status:    Tobacco Use     Smoking status: Former     Current packs/day: 0.00     Types: Cigarettes     Quit date: 1978     Years since quittin.5     Smokeless tobacco: Never     Tobacco comments:     quit    Vaping Use     Vaping status: Never Used   Substance and Sexual Activity     Alcohol use: No     Drug use: No     Sexual activity: Not Currently   Other Topics Concern     Parent/sibling w/ CABG, MI or angioplasty before 65F 55M? No     Social Determinants of Health      [facility]    Social Connections   Interpersonal Safety: Low Risk  (2024)    Interpersonal Safety      Do you feel physically and emotionally safe where you currently live?: Yes      Within the past 12 months, have you been hit, slapped, kicked or otherwise physically hurt by someone?: No      Within the past 12 months, have you been humiliated or emotionally abused in other ways by your partner or ex-partner?: No       Family History -   Family History   Problem Relation Age of Onset     Hypertension Mother      Cardiovascular Mother         CHF     Diabetes Mother      Lung Cancer Father         smoker     Cancer Daughter         brain tumor     Thyroid Disease No family hx of        Review of Systems:   !.  Weight Loss: No   2. Difficulty Breathing: No   3. Difficulty Swallowing: No   4. Pain: No    Physical Exam  B/P: Data Unavailable, T: Data Unavailable, P: Data Unavailable, R: Data Unavailable  Vitals: There were no vitals  taken for this visit.  BMI= There is no height or weight on file to calculate BMI.    General  Appearance - Normal  Head/Face/Scalp:    Skin - Normal    Facial Palpation - Normal    Facial Strength - Normal  Ears:    Pinna - Normal    Canal - Normal   Tympanic membrane - Normal  Nose:    External - Normal    Septum - Normal    Turbinates - Normal    Middle meatus - Normal  Oral Cavity:    Lips - Normal    Floor of Mouth - Normal    Gingiva - Normal    Tongue - Normal    Buccal - Normal    Palate - Normal  Nasopharynx:    Oropharynx:    Tonsils - Normal    Tongue base - Normal    Soft palate - Normal    Posterior pharyngeal wall - Normal  Hypopharynx:  Larynx:    Epiglottis - t    Aryepiglottic folds - normal    Arytenoids - edema, erythema, pos-rcicoid edema    False vocal cords - normal    True vocal cords -noral  Neck Masses - No  Neck lymphatics - no lymphadenopathy  Thyroid - Normal  Salivary glands - Normal    Audiogram - not applicable  Radiology - not applicable   Reports:   View films:  Procedures     Indications for flexible endoscopy were discussed with the patient. Verbal consent was obtained. Topical anesthesia of the nasal mucosa was obtained with a mixture of lidocaine with neosynephrine. Flexible [id number] was then performed.  Patient Education:     A/P - Sigrid Mcneill is a 73 year old female  Medical Decision Making: cough, sensation of lump in throat x 2 months  LPR changes on endoscopy  Increase dose if Omeprazole to 40 mg and move to pm      Again, thank you for allowing me to participate in the care of your patient.        Sincerely,        Sandeep Guardado MD

## 2024-07-17 NOTE — NURSING NOTE
"Chief Complaint   Patient presents with    Consult     Per Pt,   R05.3 (ICD-10-CM) - Chronic cough   J32.9 (ICD-10-CM) - Chronic congestion of paranasal sinus referral from   Samantha Bess APRN CNP recs in epic          Vitals:    07/17/24 0747 07/17/24 0749   BP: (!) 165/88 (!) 150/93   BP Location: Left arm Right arm   Patient Position: Sitting    Cuff Size: Adult Regular    Pulse: 68    Resp: 14    Temp: 96.9  F (36.1  C)    TempSrc: Tympanic    SpO2: 97%    Weight: 73.8 kg (162 lb 12.8 oz)    Height: 1.695 m (5' 6.75\")      Wt Readings from Last 1 Encounters:   07/17/24 73.8 kg (162 lb 12.8 oz)       Sabina COATES CMA.................7/17/2024    "

## 2024-07-23 DIAGNOSIS — R05.3 CHRONIC COUGH: ICD-10-CM

## 2024-07-23 DIAGNOSIS — I10 BENIGN ESSENTIAL HYPERTENSION: ICD-10-CM

## 2024-07-23 DIAGNOSIS — E11.9 TYPE 2 DIABETES MELLITUS WITHOUT COMPLICATION, WITHOUT LONG-TERM CURRENT USE OF INSULIN (H): ICD-10-CM

## 2024-07-23 NOTE — TELEPHONE ENCOUNTER
Patient has [facility] coverage and with this insurance plan, the patient is eligible to receive certain prescriptions as a 100-day supply at the 90-day supply cost.      Prescriptions to be updated to 100-day supply: metformin and losartan    New prescription needed given insufficient refills so pended for PCP review and signature.       Thank you!    Samira Spain, PharmD, [facility]  Population Health Pharmacist  [phone]

## 2024-07-24 RX ORDER — METFORMIN HCL 500 MG
TABLET, EXTENDED RELEASE 24 HR ORAL
Qty: 200 TABLET | Refills: 0 | Status: SHIPPED | OUTPATIENT
Start: 2024-07-24 | End: 2024-09-13

## 2024-07-24 RX ORDER — LOSARTAN POTASSIUM 25 MG/1
25 TABLET ORAL DAILY
Qty: 100 TABLET | Refills: 0 | Status: SHIPPED | OUTPATIENT
Start: 2024-07-24 | End: 2024-09-13

## 2024-07-24 NOTE — TELEPHONE ENCOUNTER
Due for annual physical and labs.  Future office visit is already scheduled.  Prescription refilled x90 days.  Further refills will be addressed at office visit.

## 2024-07-24 NOTE — TELEPHONE ENCOUNTER
Prescription refilled now without further refills. Patient has appointment scheduled. Further refills will be addressed at that visit.

## 2024-08-08 ENCOUNTER — MYC MEDICAL ADVICE (OUTPATIENT)
Dept: FAMILY MEDICINE | Facility: CLINIC | Age: 74
End: 2024-08-08
Payer: COMMERCIAL

## 2024-08-09 NOTE — TELEPHONE ENCOUNTER
Patient Quality Outreach    Patient is due for the following:   Breast Cancer Screening - Mammogram and Adult Preventative    Next Steps:   Schedule a Adult Preventative    Type of outreach:    Sent [employer] message.      Questions for provider review:    None           Gloria Mosquera

## 2024-08-26 ENCOUNTER — MYC MEDICAL ADVICE (OUTPATIENT)
Dept: OTOLARYNGOLOGY | Facility: CLINIC | Age: 74
End: 2024-08-26
Payer: COMMERCIAL

## 2024-08-26 NOTE — TELEPHONE ENCOUNTER
Per Dr. Guardado pt to be scheduled in 1-2 weeks. Pt has been scheduled for 9/4/24 at 10:00 am. Pt notified to arrive at 9:45 am.    Sirena Heaton   [facility] [place]   [facility]  [phone] ( Please do not share number with patient)

## 2024-09-01 NOTE — PROGRESS NOTES
Sigrid Mcneill is a 73 year old female  Chief Complaint: follow up for Chronic cough at least 2 months, choking sensation in the throat, hoarseness - improved significantly on 20 mg omeprazole BID  History of Present Illness  Location: throat  Quality: fullness  Severity:mild  Duration: 2 monthe    Past Medical History -   Patient Active Problem List   Diagnosis    Type 2 diabetes mellitus without complication (H)       Current Medications -   Current Outpatient Medications:     aspirin 81 MG EC tablet, Take 1 tablet (81 mg) by mouth daily (Patient not taking: Reported on 7/17/2024), Disp: 90 tablet, Rfl: 3    blood glucose monitoring (NO BRAND SPECIFIED) test strip, Use to test blood sugars 2-3 times daily or as directed. ReliOn Prime Glucose test strips, what ever pt wants/Insurance will cover. (Patient not taking: Reported on 9/7/2023), Disp: 200 each, Rfl: 5    Blood Glucose Monitoring Suppl (RELION CONFIRM GLUCOSE MONITOR) W/DEVICE KIT, 1 Device 3 times daily (before meals) (Patient not taking: Reported on 9/7/2023), Disp: 1 kit, Rfl: 1    CLARITIN 10 MG OR TABS, 1 TABLET DAILY prn, Disp: , Rfl:     fish oil-omega-3 fatty acids 1000 MG capsule, Take by mouth daily (Patient not taking: Reported on 7/17/2024), Disp: , Rfl:     losartan (COZAAR) 25 MG tablet, Take 1 tablet (25 mg) by mouth daily, Disp: 100 tablet, Rfl: 0    metFORMIN (GLUCOPHAGE XR) 500 MG 24 hr tablet, TAKE 2 TABLETS BY MOUTH ONCE DAILY WITH BREAKFAST, Disp: 200 tablet, Rfl: 0    olive oil external oil, Apply topically daily, Disp: , Rfl:     omeprazole (PRILOSEC) 40 MG DR capsule, Take 1 capsule (40 mg) by mouth daily, Disp: 90 capsule, Rfl: 1    RELION LANCETS THIN 26G MISC, 1 each 3 times daily (Patient not taking: Reported on 5/1/2024), Disp: 300 each, Rfl: 1    simethicone (MYLICON) 80 MG chewable tablet, Take 1 tablet (80 mg) by mouth every 6 hours as needed for flatulence or cramping, Disp: 120 tablet, Rfl: 11    STATIN NOT PRESCRIBED  "(INTENTIONAL), Please choose reason not prescribed, below (Patient not taking: Reported on 2023), Disp: , Rfl:     Turmeric 500 MG CAPS, , Disp: , Rfl:     Allergies - No Known Allergies    Social History -   Social History     Socioeconomic History    Marital status:    Tobacco Use    Smoking status: Former     Current packs/day: 0.00     Types: Cigarettes     Quit date: 1978     Years since quittin.5    Smokeless tobacco: Never    Tobacco comments:     quit    Vaping Use    Vaping status: Never Used   Substance and Sexual Activity    Alcohol use: No    Drug use: No    Sexual activity: Not Currently   Other Topics Concern    Parent/sibling w/ CABG, MI or angioplasty before 65F 55M? No     Social Determinants of Health      [facility] [employer] [facility] [employer] [employer]    Social Connections   Interpersonal Safety: Low Risk  (2024)    Interpersonal Safety     Do you feel physically and emotionally safe where you currently live?: Yes     Within the past 12 months, have you been hit, slapped, kicked or otherwise physically hurt by someone?: No     Within the past 12 months, have you been humiliated or emotionally abused in other ways by your partner or ex-partner?: No       Family History -   Family History   Problem Relation Age of Onset    Hypertension Mother     Cardiovascular Mother         CHF    Diabetes Mother     Lung Cancer Father         smoker    Cancer Daughter         brain tumor    Thyroid Disease No family hx of        Review of Systems:   !.  Weight Loss: No   2. Difficulty Breathing: No   3. Difficulty Swallowing: No   4. Pain: No    Physical Exam  B/P: Data Unavailable, T: Data Unavailable, P: Data Unavailable, R: Data Unavailable  Vitals: BP (!) 147/94   Pulse 72   Temp 97.7  F (36.5  C) (Tympanic)   Ht 1.695 m (5' 6.75\")   Wt 73.5 kg (162 lb)   SpO2 96%   BMI 25.56 kg/m    BMI= Body mass index is 25.56 " "kg/m .    General  Appearance - Normal  Head/Face/Scalp:    Skin - Normal    Facial Palpation - Normal    Facial Strength - Normal  Ears:    Pinna - Normal    Canal - Normal   Tympanic membrane - Normal  Nose:    External - Normal    Septum - Normal    Turbinates - Normal    Middle meatus - Normal  Oral Cavity:    Lips - Normal    Floor of Mouth - Normal    Gingiva - Normal    Tongue - Normal    Buccal - Normal    Palate - Normal  Nasopharynx:    Oropharynx:    Tonsils - Normal    Tongue base - Normal    Soft palate - Normal    Posterior pharyngeal wall - Normal  Hypopharynx:  Larynx:    Epiglottis - t    Aryepiglottic folds - normal    Arytenoids - edema, erythema, pos-rcicoid edema    False vocal cords - normal    True vocal cords -noral  Neck Masses - No  Neck lymphatics - no lymphadenopathy  Thyroid - Normal  Salivary glands - Normal    Audiogram - not applicable  Radiology - not applicable   Reports:   View films:  Procedures       A/P - Sigrid Mcneill is a 73 year old female  Medical Decision py  Increase of dose if Omeprazole to 40 mg ain pm was not very helpful, but 20 mg BID was helpful. Now someinteermittent hoarseness, but not cough and not choking sensation.Will continue with BID dose for the next 6 weeks. Will contact me via \"my chart\"  "

## 2024-09-04 ENCOUNTER — OFFICE VISIT (OUTPATIENT)
Dept: OTOLARYNGOLOGY | Facility: CLINIC | Age: 74
End: 2024-09-04
Payer: COMMERCIAL

## 2024-09-04 VITALS
HEART RATE: 72 BPM | HEIGHT: 67 IN | BODY MASS INDEX: 25.43 KG/M2 | DIASTOLIC BLOOD PRESSURE: 94 MMHG | SYSTOLIC BLOOD PRESSURE: 147 MMHG | TEMPERATURE: 97.7 F | OXYGEN SATURATION: 96 % | WEIGHT: 162 LBS

## 2024-09-04 DIAGNOSIS — K21.9 LARYNGOPHARYNGEAL REFLUX: Primary | ICD-10-CM

## 2024-09-04 PROCEDURE — 99213 OFFICE O/P EST LOW 20 MIN: CPT | Performed by: OTOLARYNGOLOGY

## 2024-09-04 RX ORDER — MULTIVITAMIN WITH IRON
1 TABLET ORAL DAILY
COMMUNITY

## 2024-09-04 ASSESSMENT — PAIN SCALES - GENERAL: PAINLEVEL: NO PAIN (0)

## 2024-09-04 NOTE — LETTER
9/4/2024      Sigrid Mcneill  [address] [address] [place] [address]      Dear Colleague,    Thank you for referring your patient, Sigrid Mcneill, to the [facility]. Please see a copy of my visit note below.    Sigrid Mcneill is a 73 year old female  Chief Complaint: follow up for Chronic cough at least 2 months, choking sensation in the throat, hoarseness - improved significantly on 20 mg omeprazole BID  History of Present Illness  Location: throat  Quality: fullness  Severity:mild  Duration: 2 monthe    Past Medical History -   Patient Active Problem List   Diagnosis     Type 2 diabetes mellitus without complication (H)       Current Medications -   Current Outpatient Medications:      aspirin 81 MG EC tablet, Take 1 tablet (81 mg) by mouth daily (Patient not taking: Reported on 7/17/2024), Disp: 90 tablet, Rfl: 3     blood glucose monitoring (NO BRAND SPECIFIED) test strip, Use to test blood sugars 2-3 times daily or as directed. ReliOn Prime Glucose test strips, what ever pt wants/Insurance will cover. (Patient not taking: Reported on 9/7/2023), Disp: 200 each, Rfl: 5     Blood Glucose Monitoring Suppl (RELION CONFIRM GLUCOSE MONITOR) W/DEVICE KIT, 1 Device 3 times daily (before meals) (Patient not taking: Reported on 9/7/2023), Disp: 1 kit, Rfl: 1     CLARITIN 10 MG OR TABS, 1 TABLET DAILY prn, Disp: , Rfl:      fish oil-omega-3 fatty acids 1000 MG capsule, Take by mouth daily (Patient not taking: Reported on 7/17/2024), Disp: , Rfl:      losartan (COZAAR) 25 MG tablet, Take 1 tablet (25 mg) by mouth daily, Disp: 100 tablet, Rfl: 0     metFORMIN (GLUCOPHAGE XR) 500 MG 24 hr tablet, TAKE 2 TABLETS BY MOUTH ONCE DAILY WITH BREAKFAST, Disp: 200 tablet, Rfl: 0     olive oil external oil, Apply topically daily, Disp: , Rfl:      omeprazole (PRILOSEC) 40 MG DR capsule, Take 1 capsule (40 mg) by mouth daily, Disp: 90 capsule, Rfl: 1     RELION LANCETS THIN 26G MISC, 1 each 3 times  daily (Patient not taking: Reported on 2024), Disp: 300 each, Rfl: 1     simethicone (MYLICON) 80 MG chewable tablet, Take 1 tablet (80 mg) by mouth every 6 hours as needed for flatulence or cramping, Disp: 120 tablet, Rfl: 11     STATIN NOT PRESCRIBED (INTENTIONAL), Please choose reason not prescribed, below (Patient not taking: Reported on 2023), Disp: , Rfl:      Turmeric 500 MG CAPS, , Disp: , Rfl:     Allergies - No Known Allergies    Social History -   Social History     Socioeconomic History     Marital status:    Tobacco Use     Smoking status: Former     Current packs/day: 0.00     Types: Cigarettes     Quit date: 1978     Years since quittin.5     Smokeless tobacco: Never     Tobacco comments:     quit    Vaping Use     Vaping status: Never Used   Substance and Sexual Activity     Alcohol use: No     Drug use: No     Sexual activity: Not Currently   Other Topics Concern     Parent/sibling w/ CABG, MI or angioplasty before 65F 55M? No     Social Determinants of Health      Received from [employer], [facility]    Social Connections   Interpersonal Safety: Low Risk  (2024)    Interpersonal Safety      Do you feel physically and emotionally safe where you currently live?: Yes      Within the past 12 months, have you been hit, slapped, kicked or otherwise physically hurt by someone?: No      Within the past 12 months, have you been humiliated or emotionally abused in other ways by your partner or ex-partner?: No       Family History -   Family History   Problem Relation Age of Onset     Hypertension Mother      Cardiovascular Mother         CHF     Diabetes Mother      Lung Cancer Father         smoker     Cancer Daughter         brain tumor     Thyroid Disease No family hx of        Review of Systems:   !.  Weight Loss: No   2. Difficulty Breathing: No   3. Difficulty Swallowing: No   4. Pain: No    Physical  "Exam  B/P: Data Unavailable, T: Data Unavailable, P: Data Unavailable, R: Data Unavailable  Vitals: BP (!) 147/94   Pulse 72   Temp 97.7  F (36.5  C) (Tympanic)   Ht 1.695 m (5' 6.75\")   Wt 73.5 kg (162 lb)   SpO2 96%   BMI 25.56 kg/m    BMI= Body mass index is 25.56 kg/m .    General  Appearance - Normal  Head/Face/Scalp:    Skin - Normal    Facial Palpation - Normal    Facial Strength - Normal  Ears:    Pinna - Normal    Canal - Normal   Tympanic membrane - Normal  Nose:    External - Normal    Septum - Normal    Turbinates - Normal    Middle meatus - Normal  Oral Cavity:    Lips - Normal    Floor of Mouth - Normal    Gingiva - Normal    Tongue - Normal    Buccal - Normal    Palate - Normal  Nasopharynx:    Oropharynx:    Tonsils - Normal    Tongue base - Normal    Soft palate - Normal    Posterior pharyngeal wall - Normal  Hypopharynx:  Larynx:    Epiglottis - t    Aryepiglottic folds - normal    Arytenoids - edema, erythema, pos-rcicoid edema    False vocal cords - normal    True vocal cords -noral  Neck Masses - No  Neck lymphatics - no lymphadenopathy  Thyroid - Normal  Salivary glands - Normal    Audiogram - not applicable  Radiology - not applicable   Reports:   View films:  Procedures       A/P - Sigrid Mcneill is a 73 year old female  Medical Decision py  Increase of dose if Omeprazole to 40 mg ain pm was not very helpful, but 20 mg BID was helpful. Now someinteermittent hoarseness, but not cough and not choking sensation.Will continue with BID dose for the next 6 weeks. Will contact me via \"my chart\"      Again, thank you for allowing me to participate in the care of your patient.        Sincerely,        Sandeep Guardado MD  "

## 2024-09-13 ENCOUNTER — OFFICE VISIT (OUTPATIENT)
Dept: FAMILY MEDICINE | Facility: CLINIC | Age: 74
End: 2024-09-13
Attending: FAMILY MEDICINE
Payer: COMMERCIAL

## 2024-09-13 VITALS
HEART RATE: 76 BPM | OXYGEN SATURATION: 98 % | RESPIRATION RATE: 16 BRPM | BODY MASS INDEX: 25.43 KG/M2 | WEIGHT: 162 LBS | HEIGHT: 67 IN | TEMPERATURE: 97.6 F | DIASTOLIC BLOOD PRESSURE: 88 MMHG | SYSTOLIC BLOOD PRESSURE: 132 MMHG

## 2024-09-13 DIAGNOSIS — I10 BENIGN ESSENTIAL HYPERTENSION: ICD-10-CM

## 2024-09-13 DIAGNOSIS — Z12.31 VISIT FOR SCREENING MAMMOGRAM: ICD-10-CM

## 2024-09-13 DIAGNOSIS — E11.9 TYPE 2 DIABETES MELLITUS WITHOUT COMPLICATION, WITHOUT LONG-TERM CURRENT USE OF INSULIN (H): ICD-10-CM

## 2024-09-13 DIAGNOSIS — Z00.00 ENCOUNTER FOR MEDICARE ANNUAL WELLNESS EXAM: Primary | ICD-10-CM

## 2024-09-13 DIAGNOSIS — Z12.11 SCREEN FOR COLON CANCER: ICD-10-CM

## 2024-09-13 LAB
ANION GAP SERPL CALCULATED.3IONS-SCNC: 9 MMOL/L (ref 7–15)
BUN SERPL-MCNC: 12.4 MG/DL (ref 8–23)
CALCIUM SERPL-MCNC: 9.3 MG/DL (ref 8.8–10.4)
CHLORIDE SERPL-SCNC: 105 MMOL/L (ref 98–107)
CHOLEST SERPL-MCNC: 160 MG/DL
CREAT SERPL-MCNC: 0.92 MG/DL (ref 0.51–0.95)
CREAT UR-MCNC: 43.2 MG/DL
EGFRCR SERPLBLD CKD-EPI 2021: 65 ML/MIN/1.73M2
FASTING STATUS PATIENT QL REPORTED: NO
FASTING STATUS PATIENT QL REPORTED: NO
GLUCOSE SERPL-MCNC: 140 MG/DL (ref 70–99)
HBA1C MFR BLD: 6.8 % (ref 0–5.6)
HCO3 SERPL-SCNC: 26 MMOL/L (ref 22–29)
HDLC SERPL-MCNC: 40 MG/DL
LDLC SERPL CALC-MCNC: 69 MG/DL
MICROALBUMIN UR-MCNC: <12 MG/L
MICROALBUMIN/CREAT UR: NORMAL MG/G{CREAT}
NONHDLC SERPL-MCNC: 120 MG/DL
POTASSIUM SERPL-SCNC: 4.6 MMOL/L (ref 3.4–5.3)
SODIUM SERPL-SCNC: 140 MMOL/L (ref 135–145)
TRIGL SERPL-MCNC: 253 MG/DL

## 2024-09-13 PROCEDURE — 80048 BASIC METABOLIC PNL TOTAL CA: CPT | Performed by: FAMILY MEDICINE

## 2024-09-13 PROCEDURE — 80061 LIPID PANEL: CPT | Performed by: FAMILY MEDICINE

## 2024-09-13 PROCEDURE — 83036 HEMOGLOBIN GLYCOSYLATED A1C: CPT | Performed by: FAMILY MEDICINE

## 2024-09-13 PROCEDURE — 82043 UR ALBUMIN QUANTITATIVE: CPT | Performed by: FAMILY MEDICINE

## 2024-09-13 PROCEDURE — 82570 ASSAY OF URINE CREATININE: CPT | Performed by: FAMILY MEDICINE

## 2024-09-13 PROCEDURE — 36415 COLL VENOUS BLD VENIPUNCTURE: CPT | Performed by: FAMILY MEDICINE

## 2024-09-13 RX ORDER — METFORMIN HCL 500 MG
TABLET, EXTENDED RELEASE 24 HR ORAL
Qty: 200 TABLET | Refills: 4 | Status: SHIPPED | OUTPATIENT
Start: 2024-09-13

## 2024-09-13 RX ORDER — LOSARTAN POTASSIUM 25 MG/1
25 TABLET ORAL DAILY
Qty: 100 TABLET | Refills: 4 | Status: SHIPPED | OUTPATIENT
Start: 2024-09-13

## 2024-09-13 ASSESSMENT — PAIN SCALES - GENERAL: PAINLEVEL: NO PAIN (0)

## 2024-09-13 NOTE — PROGRESS NOTES
"Preventive Care Visit  [facility]  Idalia Gage MD, Family Medicine  Sep 13, 2024      Assessment & Plan     Encounter for Medicare annual wellness exam    Type 2 diabetes mellitus without complication, without long-term current use of insulin (H)  Well controlled. Refilled medication. Check labs.    - HEMOGLOBIN A1C; Future  - BASIC METABOLIC PANEL; Future  - Lipid panel reflex to direct LDL Non-fasting; Future  - Albumin Random Urine Quantitative with Creat Ratio; Future  - HEMOGLOBIN A1C  - BASIC METABOLIC PANEL  - Lipid panel reflex to direct LDL Non-fasting  - Albumin Random Urine Quantitative with Creat Ratio  - losartan (COZAAR) 25 MG tablet; Take 1 tablet (25 mg) by mouth daily.  - metFORMIN (GLUCOPHAGE XR) 500 MG 24 hr tablet; TAKE 2 TABLETS BY MOUTH ONCE DAILY WITH BREAKFAST    Benign essential hypertension  Well controlled. Refilled medication. Check labs.    - BASIC METABOLIC PANEL; Future  - BASIC METABOLIC PANEL  - losartan (COZAAR) 25 MG tablet; Take 1 tablet (25 mg) by mouth daily.    Visit for screening mammogram  Declined. Aware of risks of not screening.    Screen for colon cancer  Declined. Aware of risks of not screening.    Patient has been advised of split billing requirements and indicates understanding: Yes        BMI  Estimated body mass index is 25.56 kg/m  as calculated from the following:    Height as of this encounter: 1.695 m (5' 6.75\").    Weight as of this encounter: 73.5 kg (162 lb).       Counseling  Appropriate preventive services were addressed with this patient via screening, questionnaire, or discussion as appropriate for fall prevention, nutrition, physical activity, Tobacco-use cessation, social engagement, weight loss and cognition.  Checklist reviewing preventive services available has been given to the patient.          Jony Matta is a 74 year old, presenting for the following:  Wellness Visit        9/13/2024    10:35 AM "   Additional Questions   Roomed by Gloria COATES CMA           Health Care Directive  Patient does not have a Health Care Directive or Living Will: Patient states has Advance Directive and will bring in a copy to clinic.    HPI              9/13/2024   General Health   How would you rate your overall physical health? Good   Feel stress (tense, anxious, or unable to sleep) Only a little      (!) STRESS CONCERN      9/13/2024   Nutrition   Diet: Regular (no restrictions)            9/13/2024   Exercise   Days per week of moderate/strenous exercise 4 days   Average minutes spent exercising at this level 30 min            9/13/2024   Social Factors   Frequency of gathering with friends or relatives More than three times a week   Worry food won't last until get money to buy more No   Food not last or not have enough money for food? No   Do you have housing? (Housing is defined as stable permanent housing and does not include staying ouside in a car, in a tent, in an abandoned building, in an overnight shelter, or couch-surfing.) Yes   Are you worried about losing your housing? No   Lack of transportation? No   Unable to get utilities (heat,electricity)? No            9/13/2024   Fall Risk   Fallen 2 or more times in the past year? No    No   Trouble with walking or balance? No    No       Multiple values from one day are sorted in reverse-chronological order          9/13/2024   Activities of Daily Living- Home Safety   Needs help with the following daily activites None of the above   Safety concerns in the home None of the above            9/13/2024   Dental   Dentist two times every year? (!) NO            9/13/2024   Hearing Screening   Hearing concerns? None of the above            9/13/2024   Driving Risk Screening   Patient/family members have concerns about driving No            9/13/2024   General Alertness/Fatigue Screening   Have you been more tired than usual lately? (!) YES            9/13/2024   Urinary  Incontinence Screening   Bothered by leaking urine in past 6 months Yes            2024   TB Screening   Were you born outside of the [place]? Yes            Today's PHQ-2 Score:       2024    10:22 AM   PHQ-2 (  [employer])   Q1: Little interest or pleasure in doing things 0   Q2: Feeling down, depressed or hopeless 0   PHQ-2 Score 0   Q1: Little interest or pleasure in doing things Not at all   Q2: Feeling down, depressed or hopeless Not at all   PHQ-2 Score 0           2024   Substance Use   Alcohol more than 3/day or more than 7/wk Not Applicable   Do you have a current opioid prescription? No   How severe/bad is pain from 1 to 10? 0/10 (No Pain)   Do you use any other substances recreationally? No        Social History     Tobacco Use    Smoking status: Former     Current packs/day: 0.00     Types: Cigarettes     Quit date: 1978     Years since quittin.7    Smokeless tobacco: Never    Tobacco comments:     quit    Vaping Use    Vaping status: Never Used   Substance Use Topics    Alcohol use: No    Drug use: No          Mammogram Screening - Mammogram every 1-2 years updated in Health Maintenance based on mutual decision making    ASCVD Risk   The 10-year ASCVD risk score (Sahil HINTON, et al., 2019) is: 34.6%    Values used to calculate the score:      Age: 74 years      Sex: Female      Is Non- : No      Diabetic: Yes      Tobacco smoker: No      Systolic Blood Pressure: 132 mmHg      Is BP treated: Yes      HDL Cholesterol: 40 mg/dL      Total Cholesterol: 160 mg/dL              Reviewed and updated as needed this visit by Provider   Tobacco  Allergies  Meds  Problems  Med Hx  Surg Hx  Fam Hx            Past Medical History:   Diagnosis Date    Esophageal reflux 2006    Hypertension     resolved with weight loss     Past Surgical History:   Procedure Laterality Date    lumbar disc surgery       Labs reviewed in EPIC  Patient Active Problem  List   Diagnosis    Benign essential hypertension    Type 2 diabetes mellitus without complication (H)     Past Surgical History:   Procedure Laterality Date    lumbar disc surgery         Social History     Tobacco Use    Smoking status: Former     Current packs/day: 0.00     Types: Cigarettes     Quit date: 1978     Years since quittin.7    Smokeless tobacco: Never    Tobacco comments:     quit    Substance Use Topics    Alcohol use: No     Family History   Problem Relation Age of Onset    Hypertension Mother     Cardiovascular Mother         CHF    Diabetes Mother     Lung Cancer Father         smoker    Cancer Daughter         brain tumor    Thyroid Disease No family hx of          Current Outpatient Medications   Medication Sig Dispense Refill    CLARITIN 10 MG OR TABS 1 TABLET DAILY prn      losartan (COZAAR) 25 MG tablet Take 1 tablet (25 mg) by mouth daily. 100 tablet 4    magnesium 250 MG tablet Take 1 tablet by mouth daily.      metFORMIN (GLUCOPHAGE XR) 500 MG 24 hr tablet TAKE 2 TABLETS BY MOUTH ONCE DAILY WITH BREAKFAST 200 tablet 4    olive oil external oil Apply topically daily      omeprazole (PRILOSEC) 40 MG DR capsule Take 1 capsule (40 mg) by mouth daily 90 capsule 1    simethicone (MYLICON) 80 MG chewable tablet Take 1 tablet (80 mg) by mouth every 6 hours as needed for flatulence or cramping 120 tablet 11    Turmeric 500 MG CAPS        No Known Allergies  Current providers sharing in care for this patient include:  Patient Care Team:  Idalia Gage MD as PCP - General (Family Practice)  Pawan Cannon MD as MD (Dermatology)  Idalia Gage MD as Assigned PCP  Sandeep Guardado MD as Physician (Otolaryngology)  Sandeep Guardado MD as Assigned Surgical Provider    The following health maintenance items are reviewed in Epic and correct as of today:  Health Maintenance   Topic Date Due    DEXA  Never done    Pneumococcal Vaccine: 65+ Years (1 of 2 - PCV)  "Never done    COLORECTAL CANCER SCREENING  Never done    ZOSTER IMMUNIZATION (1 of 2) Never done    MAMMO SCREENING  03/30/2007    RSV VACCINE (1 - Risk 60-74 years 1-dose series) Never done    INFLUENZA VACCINE (1) 09/01/2024    COVID-19 Vaccine (3 - 2024-25 season) 09/01/2024    DIABETIC FOOT EXAM  09/07/2024    EYE EXAM  10/02/2024    A1C  03/13/2025    MEDICARE ANNUAL WELLNESS VISIT  09/13/2025    BMP  09/13/2025    LIPID  09/13/2025    MICROALBUMIN  09/13/2025    ANNUAL REVIEW OF HM ORDERS  09/13/2025    FALL RISK ASSESSMENT  09/13/2025    DTAP/TDAP/TD IMMUNIZATION (2 - Td or Tdap) 02/02/2026    ADVANCE CARE PLANNING  09/13/2029    HEPATITIS C SCREENING  Completed    PHQ-2 (once per calendar year)  Completed    HPV IMMUNIZATION  Aged Out    MENINGITIS IMMUNIZATION  Aged Out    RSV MONOCLONAL ANTIBODY  Aged Out         Review of Systems  Constitutional, neuro, ENT, endocrine, pulmonary, cardiac, gastrointestinal, genitourinary, musculoskeletal, integument and psychiatric systems are negative, except as otherwise noted.     Objective    Exam  /88   Pulse 76   Temp 97.6  F (36.4  C) (Tympanic)   Resp 16   Ht 1.695 m (5' 6.75\")   Wt 73.5 kg (162 lb)   SpO2 98%   BMI 25.56 kg/m     Estimated body mass index is 25.56 kg/m  as calculated from the following:    Height as of this encounter: 1.695 m (5' 6.75\").    Weight as of this encounter: 73.5 kg (162 lb).    Physical Exam  GENERAL: alert and no distress  EYES: Eyes grossly normal to inspection, PERRL and conjunctivae and sclerae normal  HENT: normal cephalic/atraumatic and ear canals and TM's normal  NECK: no adenopathy, no asymmetry, masses, or scars  RESP: lungs clear to auscultation - no rales, rhonchi or wheezes  BREAST: normal without masses, tenderness or nipple discharge and no palpable axillary masses or adenopathy  CV: regular rate and rhythm, normal S1 S2, no S3 or S4, no murmur, click or rub, no peripheral edema  ABDOMEN: soft, nontender, no " hepatosplenomegaly, no masses and bowel sounds normal  MS: no gross musculoskeletal defects noted, no edema  SKIN: no suspicious lesions or rashes  NEURO: Normal strength and tone, mentation intact and speech normal  PSYCH: mentation appears normal, affect normal/bright    Lab Results   Component Value Date    A1C 6.8 09/13/2024    A1C 6.7 09/07/2023    A1C 6.9 08/10/2022    A1C 7.2 07/23/2021    A1C 7.1 12/07/2018    A1C 7.2 11/08/2017    A1C 6.4 06/13/2016    A1C 13.4 02/02/2016             10/3/2024   Mini Cog   Clock Draw Score 2 Normal    2 Normal   3 Item Recall 3 objects recalled    3 objects recalled   Mini Cog Total Score 5    5       Multiple values from one day are sorted in reverse-chronological order              Signed Electronically by: Idalia Gage MD

## 2024-09-13 NOTE — LETTER
September 26, 2024      Sigrid Mcneill  [id number] [facility] [id number]        Dear ,    We are writing to inform you of your test results. Cholesterol mildly elevated.  Not abnormal enough at this point to warrant medication changes but I would recommend: increasing daily exercise, increasing dietary fiber, eliminating trans fats and reducing saturated fats. Otherwise labs normal or within acceptable limits.       Resulted Orders   HEMOGLOBIN A1C   Result Value Ref Range    Hemoglobin A1C 6.8 (H) 0.0 - 5.6 %      Comment:      Normal <5.7%   Prediabetes 5.7-6.4%    Diabetes 6.5% or higher     Note: Adopted from ADA consensus guidelines.   BASIC METABOLIC PANEL   Result Value Ref Range    Sodium 140 135 - 145 mmol/L    Potassium 4.6 3.4 - 5.3 mmol/L    Chloride 105 98 - 107 mmol/L    Carbon Dioxide (CO2) 26 22 - 29 mmol/L    Anion Gap 9 7 - 15 mmol/L    Urea Nitrogen 12.4 8.0 - 23.0 mg/dL    Creatinine 0.92 0.51 - 0.95 mg/dL    GFR Estimate 65 >60 mL/min/1.73m2      Comment:      eGFR calculated using 2021 CKD-EPI equation.    Calcium 9.3 8.8 - 10.4 mg/dL      Comment:      Reference intervals for this test were updated on 7/16/2024 to reflect our healthy population more accurately. There may be differences in the flagging of prior results with similar values performed with this method. Those prior results can be interpreted in the context of the updated reference intervals.    Glucose 140 (H) 70 - 99 mg/dL    Patient Fasting > 8hrs? No    Lipid panel reflex to direct LDL Non-fasting   Result Value Ref Range    Cholesterol 160 <200 mg/dL    Triglycerides 253 (H) <150 mg/dL    Direct Measure HDL 40 (L) >=50 mg/dL    LDL Cholesterol Calculated 69 <100 mg/dL    Non HDL Cholesterol 120 <130 mg/dL    Patient Fasting > 8hrs? No     Narrative    Cholesterol  Desirable: < 200 mg/dL  Borderline High: 200 - 239 mg/dL  High: >= 240 mg/dL    Triglycerides  Normal: < 150 mg/dL  Borderline High: 150 -  199 mg/dL  High: 200-499 mg/dL  Very High: >= 500 mg/dL    Direct Measure HDL  Female: >= 50 mg/dL   Male: >= 40 mg/dL    LDL Cholesterol  Desirable: < 100 mg/dL  Above Desirable: 100 - 129 mg/dL   Borderline High: 130 - 159 mg/dL   High:  160 - 189 mg/dL   Very High: >= 190 mg/dL    Non HDL Cholesterol  Desirable: < 130 mg/dL  Above Desirable: 130 - 159 mg/dL  Borderline High: 160 - 189 mg/dL  High: 190 - 219 mg/dL  Very High: >= 220 mg/dL   Albumin Random Urine Quantitative with Creat Ratio   Result Value Ref Range    Creatinine Urine mg/dL 43.2 mg/dL      Comment:      The reference ranges have not been established in urine creatinine. The results should be integrated into the clinical context for interpretation.    Albumin Urine mg/L <12.0 mg/L      Comment:      The reference ranges have not been established in urine albumin. The results should be integrated into the clinical context for interpretation.    Albumin Urine mg/g Cr        Comment:      Unable to calculate, urine albumin and/or urine creatinine is outside detectable limits.  Microalbuminuria is defined as an albumin:creatinine ratio of 17 to 299 for males and 25 to 299 for females. A ratio of albumin:creatinine of 300 or higher is indicative of overt proteinuria.  Due to biologic variability, positive results should be confirmed by a second, first-morning random or 24-hour timed urine specimen. If there is discrepancy, a third specimen is recommended. When 2 out of 3 results are in the microalbuminuria range, this is evidence for incipient nephropathy and warrants increased efforts at glucose control, blood pressure control, and institution of therapy with an angiotensin-converting-enzyme (ACE) inhibitor (if the patient can tolerate it).         If you have any questions or concerns, please call the clinic at the number listed above.       Sincerely,      Idalia Gage MD

## 2024-09-26 NOTE — PATIENT INSTRUCTIONS
Patient Education   Preventive Care Advice   This is general advice given by our system to help you stay healthy. However, your care team may have specific advice just for you. Please talk to your care team about your preventive care needs.  Nutrition  Eat 5 or more servings of fruits and vegetables each day.  Try wheat bread, brown rice and whole grain pasta (instead of white bread, rice, and pasta).  Get enough calcium and vitamin D. Check the label on foods and aim for 100% of the RDA (recommended daily allowance).  Lifestyle  Exercise at least 150 minutes each week  (30 minutes a day, 5 days a week).  Do muscle strengthening activities 2 days a week. These help control your weight and prevent disease.  No smoking.  Wear sunscreen to prevent skin cancer.  Have a dental exam and cleaning every 6 months.  Yearly exams  See your health care team every year to talk about:  Any changes in your health.  Any medicines your care team has prescribed.  Preventive care, family planning, and ways to prevent chronic diseases.  Shots (vaccines)   HPV shots (up to age 26), if you've never had them before.  Hepatitis B shots (up to age 59), if you've never had them before.  COVID-19 shot: Get this shot when it's due.  Flu shot: Get a flu shot every year.  Tetanus shot: Get a tetanus shot every 10 years.  Pneumococcal, hepatitis A, and RSV shots: Ask your care team if you need these based on your risk.  Shingles shot (for age 50 and up)  General health tests  Diabetes screening:  Starting at age 35, Get screened for diabetes at least every 3 years.  If you are younger than age 35, ask your care team if you should be screened for diabetes.  Cholesterol test: At age 39, start having a cholesterol test every 5 years, or more often if advised.  Bone density scan (DEXA): At age 50, ask your care team if you should have this scan for osteoporosis (brittle bones).  Hepatitis C: Get tested at least once in your life.  STIs (sexually  transmitted infections)  Before age 24: Ask your care team if you should be screened for STIs.  After age 24: Get screened for STIs if you're at risk. You are at risk for STIs (including HIV) if:  You are sexually active with more than one person.  You don't use condoms every time.  You or a partner was diagnosed with a sexually transmitted infection.  If you are at risk for HIV, ask about PrEP medicine to prevent HIV.  Get tested for HIV at least once in your life, whether you are at risk for HIV or not.  Cancer screening tests  Cervical cancer screening: If you have a cervix, begin getting regular cervical cancer screening tests starting at age 21.  Breast cancer scan (mammogram): If you've ever had breasts, begin having regular mammograms starting at age 40. This is a scan to check for breast cancer.  Colon cancer screening: It is important to start screening for colon cancer at age 45.  Have a colonoscopy test every 10 years (or more often if you're at risk) Or, ask your provider about stool tests like a FIT test every year or Cologuard test every 3 years.  To learn more about your testing options, visit:   .  For help making a decision, visit:   https://bit.ly/qf99725.  Prostate cancer screening test: If you have a prostate, ask your care team if a prostate cancer screening test (PSA) at age 55 is right for you.  Lung cancer screening: If you are a current or former smoker ages 50 to 80, ask your care team if ongoing lung cancer screenings are right for you.  For informational purposes only. Not to replace the advice of your health care provider. Copyright   2023 [email or web address]. All rights reserved. Clinically reviewed by the [facility]. [employer] [id number] - REV 01/24.

## 2024-10-08 ENCOUNTER — MYC MEDICAL ADVICE (OUTPATIENT)
Dept: FAMILY MEDICINE | Facility: CLINIC | Age: 74
End: 2024-10-08
Payer: COMMERCIAL

## 2024-10-08 NOTE — TELEPHONE ENCOUNTER
Patient Quality Outreach    Patient is due for the following:   Colon Cancer Screening    Next Steps:   No follow up needed at this time.    Type of outreach:    Chart review performed, no outreach needed.      Questions for provider review:    None           Gloria Mosquera

## 2024-10-22 ENCOUNTER — TRANSFERRED RECORDS (OUTPATIENT)
Dept: HEALTH INFORMATION MANAGEMENT | Facility: CLINIC | Age: 74
End: 2024-10-22

## 2024-10-22 ENCOUNTER — HOSPITAL ENCOUNTER (OUTPATIENT)
Facility: CLINIC | Age: 74
Setting detail: OBSERVATION
Discharge: HOME | End: 2024-10-23
Admitting: STUDENT IN AN ORGANIZED HEALTH CARE EDUCATION/TRAINING PROGRAM
Payer: COMMERCIAL

## 2024-10-22 ENCOUNTER — APPOINTMENT (OUTPATIENT)
Dept: MRI IMAGING | Facility: CLINIC | Age: 74
End: 2024-10-22
Payer: COMMERCIAL

## 2024-10-22 ENCOUNTER — APPOINTMENT (OUTPATIENT)
Dept: CT IMAGING | Facility: CLINIC | Age: 74
End: 2024-10-22
Payer: COMMERCIAL

## 2024-10-22 DIAGNOSIS — G45.9 TIA (TRANSIENT ISCHEMIC ATTACK): ICD-10-CM

## 2024-10-22 DIAGNOSIS — R20.2 FACIAL PARESTHESIA: ICD-10-CM

## 2024-10-22 DIAGNOSIS — H53.8 BLURRED VISION: Primary | ICD-10-CM

## 2024-10-22 PROBLEM — I10 HYPERTENSION: Status: ACTIVE | Noted: 2023-06-30

## 2024-10-22 PROBLEM — N18.2 CKD (CHRONIC KIDNEY DISEASE) STAGE 2, GFR 60-89 ML/MIN: Status: ACTIVE | Noted: 2024-10-22

## 2024-10-22 LAB
ANION GAP SERPL CALCULATED.3IONS-SCNC: 6 MMOL/L (ref 7–15)
BUN SERPL-MCNC: 11.3 MG/DL (ref 8–23)
CALCIUM SERPL-MCNC: 9.3 MG/DL (ref 8.8–10.4)
CHLORIDE SERPL-SCNC: 107 MMOL/L (ref 98–107)
CHOLEST SERPL-MCNC: 146 MG/DL
CREAT SERPL-MCNC: 1.01 MG/DL (ref 0.51–0.95)
EGFRCR SERPLBLD CKD-EPI 2021: 58 ML/MIN/1.73M2
ERYTHROCYTE [DISTWIDTH] IN BLOOD BY AUTOMATED COUNT: 14 % (ref 10–15)
GLUCOSE BLDC GLUCOMTR-MCNC: 125 MG/DL (ref 70–99)
GLUCOSE SERPL-MCNC: 107 MG/DL (ref 70–99)
HCO3 SERPL-SCNC: 28 MMOL/L (ref 22–29)
HCT VFR BLD AUTO: 41.2 % (ref 35–47)
HDLC SERPL-MCNC: 40 MG/DL
HGB BLD-MCNC: 13.5 G/DL (ref 11.7–15.7)
LDLC SERPL CALC-MCNC: 85 MG/DL
MCH RBC QN AUTO: 29.5 PG (ref 26.5–33)
MCHC RBC AUTO-ENTMCNC: 32.8 G/DL (ref 31.5–36.5)
MCV RBC AUTO: 90 FL (ref 78–100)
NONHDLC SERPL-MCNC: 106 MG/DL
PLATELET # BLD AUTO: 273 10E3/UL (ref 150–450)
POTASSIUM SERPL-SCNC: 4.8 MMOL/L (ref 3.4–5.3)
RBC # BLD AUTO: 4.57 10E6/UL (ref 3.8–5.2)
RETINOPATHY: NEGATIVE
SODIUM SERPL-SCNC: 141 MMOL/L (ref 135–145)
TRIGL SERPL-MCNC: 103 MG/DL
WBC # BLD AUTO: 6.7 10E3/UL (ref 4–11)

## 2024-10-22 PROCEDURE — 250N000009 HC RX 250

## 2024-10-22 PROCEDURE — 80061 LIPID PANEL: CPT

## 2024-10-22 PROCEDURE — 36415 COLL VENOUS BLD VENIPUNCTURE: CPT

## 2024-10-22 PROCEDURE — A9585 GADOBUTROL INJECTION: HCPCS

## 2024-10-22 PROCEDURE — G0378 HOSPITAL OBSERVATION PER HR: HCPCS

## 2024-10-22 PROCEDURE — 70496 CT ANGIOGRAPHY HEAD: CPT

## 2024-10-22 PROCEDURE — 82962 GLUCOSE BLOOD TEST: CPT

## 2024-10-22 PROCEDURE — 93010 ELECTROCARDIOGRAM REPORT: CPT

## 2024-10-22 PROCEDURE — 250N000013 HC RX MED GY IP 250 OP 250 PS 637: Performed by: STUDENT IN AN ORGANIZED HEALTH CARE EDUCATION/TRAINING PROGRAM

## 2024-10-22 PROCEDURE — 93005 ELECTROCARDIOGRAM TRACING: CPT

## 2024-10-22 PROCEDURE — 70450 CT HEAD/BRAIN W/O DYE: CPT

## 2024-10-22 PROCEDURE — 85027 COMPLETE CBC AUTOMATED: CPT

## 2024-10-22 PROCEDURE — 99285 EMERGENCY DEPT VISIT HI MDM: CPT | Mod: 25

## 2024-10-22 PROCEDURE — 99222 1ST HOSP IP/OBS MODERATE 55: CPT

## 2024-10-22 PROCEDURE — 255N000002 HC RX 255 OP 636

## 2024-10-22 PROCEDURE — 80048 BASIC METABOLIC PNL TOTAL CA: CPT

## 2024-10-22 PROCEDURE — 250N000011 HC RX IP 250 OP 636

## 2024-10-22 PROCEDURE — 70553 MRI BRAIN STEM W/O & W/DYE: CPT

## 2024-10-22 RX ORDER — ACETAMINOPHEN 325 MG/1
650 TABLET ORAL EVERY 4 HOURS PRN
Status: DISCONTINUED | OUTPATIENT
Start: 2024-10-22 | End: 2024-10-23 | Stop reason: HOSPADM

## 2024-10-22 RX ORDER — GADOBUTROL 604.72 MG/ML
7 INJECTION INTRAVENOUS ONCE
Status: COMPLETED | OUTPATIENT
Start: 2024-10-22 | End: 2024-10-22

## 2024-10-22 RX ORDER — LOSARTAN POTASSIUM 25 MG/1
25 TABLET ORAL DAILY
Status: DISCONTINUED | OUTPATIENT
Start: 2024-10-22 | End: 2024-10-23 | Stop reason: HOSPADM

## 2024-10-22 RX ORDER — AMOXICILLIN 250 MG
2 CAPSULE ORAL 2 TIMES DAILY PRN
Status: DISCONTINUED | OUTPATIENT
Start: 2024-10-22 | End: 2024-10-23 | Stop reason: HOSPADM

## 2024-10-22 RX ORDER — NICOTINE POLACRILEX 4 MG
15-30 LOZENGE BUCCAL
Status: DISCONTINUED | OUTPATIENT
Start: 2024-10-22 | End: 2024-10-23 | Stop reason: HOSPADM

## 2024-10-22 RX ORDER — SIMETHICONE 80 MG
80 TABLET,CHEWABLE ORAL EVERY 6 HOURS PRN
Status: DISCONTINUED | OUTPATIENT
Start: 2024-10-22 | End: 2024-10-23 | Stop reason: HOSPADM

## 2024-10-22 RX ORDER — CLOPIDOGREL BISULFATE 75 MG/1
300 TABLET ORAL ONCE
Status: COMPLETED | OUTPATIENT
Start: 2024-10-22 | End: 2024-10-22

## 2024-10-22 RX ORDER — OMEPRAZOLE 20 MG/1
20 TABLET, DELAYED RELEASE ORAL 2 TIMES DAILY
COMMUNITY

## 2024-10-22 RX ORDER — DEXTROSE MONOHYDRATE 25 G/50ML
25-50 INJECTION, SOLUTION INTRAVENOUS
Status: DISCONTINUED | OUTPATIENT
Start: 2024-10-22 | End: 2024-10-23 | Stop reason: HOSPADM

## 2024-10-22 RX ORDER — ONDANSETRON 2 MG/ML
4 INJECTION INTRAMUSCULAR; INTRAVENOUS EVERY 6 HOURS PRN
Status: DISCONTINUED | OUTPATIENT
Start: 2024-10-22 | End: 2024-10-23 | Stop reason: HOSPADM

## 2024-10-22 RX ORDER — AMOXICILLIN 250 MG
1 CAPSULE ORAL 2 TIMES DAILY PRN
Status: DISCONTINUED | OUTPATIENT
Start: 2024-10-22 | End: 2024-10-23 | Stop reason: HOSPADM

## 2024-10-22 RX ORDER — METFORMIN HYDROCHLORIDE 500 MG/1
1000 TABLET, EXTENDED RELEASE ORAL
Status: DISCONTINUED | OUTPATIENT
Start: 2024-10-23 | End: 2024-10-23 | Stop reason: HOSPADM

## 2024-10-22 RX ORDER — IOPAMIDOL 755 MG/ML
67 INJECTION, SOLUTION INTRAVASCULAR ONCE
Status: COMPLETED | OUTPATIENT
Start: 2024-10-22 | End: 2024-10-22

## 2024-10-22 RX ORDER — PANTOPRAZOLE SODIUM 40 MG/1
40 TABLET, DELAYED RELEASE ORAL
Status: DISCONTINUED | OUTPATIENT
Start: 2024-10-23 | End: 2024-10-23 | Stop reason: HOSPADM

## 2024-10-22 RX ORDER — ONDANSETRON 4 MG/1
4 TABLET, ORALLY DISINTEGRATING ORAL EVERY 6 HOURS PRN
Status: DISCONTINUED | OUTPATIENT
Start: 2024-10-22 | End: 2024-10-23 | Stop reason: HOSPADM

## 2024-10-22 RX ORDER — ASPIRIN 325 MG
325 TABLET ORAL ONCE
Status: COMPLETED | OUTPATIENT
Start: 2024-10-22 | End: 2024-10-22

## 2024-10-22 RX ADMIN — CLOPIDOGREL BISULFATE 225 MG: 75 TABLET ORAL at 17:28

## 2024-10-22 RX ADMIN — GADOBUTROL 7 ML: 604.72 INJECTION INTRAVENOUS at 20:01

## 2024-10-22 RX ADMIN — ASPIRIN 325 MG ORAL TABLET 325 MG: 325 PILL ORAL at 17:20

## 2024-10-22 RX ADMIN — CLOPIDOGREL BISULFATE 75 MG: 75 TABLET ORAL at 17:20

## 2024-10-22 RX ADMIN — SODIUM CHLORIDE 100 ML: 9 INJECTION, SOLUTION INTRAVENOUS at 14:35

## 2024-10-22 RX ADMIN — IOPAMIDOL 67 ML: 755 INJECTION, SOLUTION INTRAVENOUS at 14:35

## 2024-10-22 ASSESSMENT — COLUMBIA-SUICIDE SEVERITY RATING SCALE - C-SSRS
1. IN THE PAST MONTH, HAVE YOU WISHED YOU WERE DEAD OR WISHED YOU COULD GO TO SLEEP AND NOT WAKE UP?: NO
6. HAVE YOU EVER DONE ANYTHING, STARTED TO DO ANYTHING, OR PREPARED TO DO ANYTHING TO END YOUR LIFE?: NO
2. HAVE YOU ACTUALLY HAD ANY THOUGHTS OF KILLING YOURSELF IN THE PAST MONTH?: NO

## 2024-10-22 ASSESSMENT — ACTIVITIES OF DAILY LIVING (ADL)
ADLS_ACUITY_SCORE: 35
ADLS_ACUITY_SCORE: 35
ADLS_ACUITY_SCORE: 31
ADLS_ACUITY_SCORE: 31
ADLS_ACUITY_SCORE: 35
ADLS_ACUITY_SCORE: 31
ADLS_ACUITY_SCORE: 35

## 2024-10-22 NOTE — ED TRIAGE NOTES
Pt sent by eye doctor to check for TIA. Pt has 30 minutes of blurred vision yesterday from  now resolved. Pt also reported right side of face feeling pressure on it and that is also resolved. Pt does report last week having blurred vision on Wednesday for 30 minutes, pt also could not comprehend what she was reading that resolved after 30 minutes. Pt feels like she has some pressure on the left side of her head but she is not sure if it is from having her eyes dilated.      Triage Assessment (Adult)       Row Name 10/22/24 1242          Triage Assessment    Airway WDL WDL        Respiratory WDL    Respiratory WDL WDL        Cardiac WDL    Cardiac WDL WDL        Cognitive/Neuro/Behavioral WDL    Cognitive/Neuro/Behavioral WDL WDL     Level of Consciousness alert     Arousal Level opens eyes spontaneously     Orientation oriented x 4     Speech clear;spontaneous     Mood/Behavior calm;cooperative        Pupils (CN II)    Pupil PERRLA --  GIN- just dilated at eye doctor        Julius Coma Scale    Best Eye Response 4-->(E4) spontaneous     Best Motor Response 6-->(M6) obeys commands     Best Verbal Response 5-->(V5) oriented     Julius Coma Scale Score 15

## 2024-10-22 NOTE — MEDICATION SCRIBE - ADMISSION MEDICATION HISTORY
Medication Scribe Admission Medication History    Admission medication history is complete. The information provided in this note is only as accurate as the sources available at the time of the update.    Information Source(s): Patient and CareEverywhere/SureScripts via  with patient in room and finished at desk.    Pertinent Information:   She states that she is no longer getting the prescription strength Omeprazole 40 mg but taking the OTC 20 mg bid now instead.  Taking Claritin daily, not daily as needed.    Changes made to PTA medication list:  Added: None  Deleted: Olive Oil  Changed: Claritin from daily prn to daily.    Allergies reviewed with patient and updates made in EHR: yes, no allergies.    Medication History Completed By: Lisa Castillo 10/22/2024 5:50 PM    PTA Med List   Medication Sig Last Dose    CLARITIN 10 MG OR TABS Take 10 mg by mouth daily. 10/22/2024 at am    losartan (COZAAR) 25 MG tablet Take 1 tablet (25 mg) by mouth daily. 10/22/2024 at am    magnesium 250 MG tablet Take 1 tablet by mouth daily. 10/22/2024 at am    metFORMIN (GLUCOPHAGE XR) 500 MG 24 hr tablet TAKE 2 TABLETS BY MOUTH ONCE DAILY WITH BREAKFAST 10/22/2024 at am    omeprazole (PRILOSEC OTC) 20 MG EC tablet Take 20 mg by mouth 2 times daily. 10/22/2024 at am    simethicone (MYLICON) 80 MG chewable tablet Take 1 tablet (80 mg) by mouth every 6 hours as needed for flatulence or cramping Past Week at prn    Turmeric 500 MG CAPS Take 1 capsule by mouth daily. 10/22/2024 at am

## 2024-10-22 NOTE — ED PROVIDER NOTES
"[facility]  Emergency Department Visit Note    PATIENT:  Sigrid Mcneill     74 year old     female      [id number]    Chief complaint:  Chief Complaint   Patient presents with    Eye Problem     Blurred vision for 30 minutes yesterday  resolved. Sent by eye doctor for TIA          History of present illness:  Patient is a 74 year old female with HTN, non-insulin-dependent type 2 diabetes presenting for evaluation of vision changes.    Coming from optometry clinic who instructed patient to come to the emergency department due to concern for TIA.    Patient has had 2 episodes of nonspecific vision changes over the past week.  The first was roughly 1 week ago, last Wednesday, when she had blurry vision in both eyes as well as sensation of difficulty processing words on the page.  Lasted about 30 minutes but improved on its own.    Had a similar episode around 11:00 yesterday, similarly with bilateral eye blurry vision right worse than left, as well as numbness and tingling of the right side of her face.    In the emergency department she overall feels well except notes that she has a \"moving feeling\" as if she has a feeling of her body moving.  No fevers, vomiting, chest pain, dyspnea, extremity numbness/tingling/weakness.    Review of Systems:  As in HPI above    BP (!) 152/93   Pulse 70   Temp 98  F (36.7  C) (Oral)   Resp 15   Ht 1.676 m (5' 6\")   Wt 73.8 kg (162 lb 9.6 oz)   SpO2 95%   BMI 26.24 kg/m        Physical Exam  Constitutional: laying in hospital bed, alert, oriented, and in no apparent distress  HEENT: normocephalic, atraumatic, pupils 8mm, equal, round, and reactive to light, sclerae anicteric, and extraocular motions intact (had dilated eye exam prior to coming to the emergency department)  Neck: no stridor  Cardiovascular: regular rate and rhythm and no murmurs, rubs, or extra heart sounds  Pulmonary: breathing comfortably on room air and lungs clear to " auscultation bilaterally  Abdominal: soft, non-tender, non-distended  Extremities/MSK: no peripheral edema  Skin: warm, dry  Neurologic: moves all four extremities spontaneously, CNII-XII intact, 5/5  strength bilaterally, 5/5 strength in dorsiflexion and plantarflexion bilaterally, sensation intact to light touch in bilateral upper and lower extremities, ambulates without assistance, and no dysmetria on finger-nose-finger  Psychiatric: calm, appropriate      MDM:  Patient is a 74 year old female with above history presenting for evaluation of nonspecific vision changes.    Vitals notable for hypertension, otherwise within normal limits. Exam is reassuring, she appears comfortable, no neurologic deficit.    Unclear etiology of symptoms.  Would be quite atypical presentation of TIA given bilateral eye blurry vision.  Not sure what to make of the episodic right-sided facial numbness though there are no other neurologic deficits.  Presents well outside of thrombolytic window regardless.    Planning for labs, ECG, CTA head/neck to evaluate critical stenosis or other evidence of acute subacute process.    Disposition pending above workup. Remainder of ED course below.    ED COURSE:  ED Course as of 10/22/24 1651   Tue Oct 22, 2024   1400 EKG 12 lead  ECG:  - Ventricular rate 66 bpm, regular  - MN, QRS, QT intervals normal  - Axis left-deviated  - no ST segment or T wave changes concerning for acute ischemia  - Comparison to prior ECGs: none available  - My independent interpretation: Sinus rhythm   1427 Labs reviewed, reassuring without explanation for her symptoms.   1516 CTA Head Neck with Contrast  No stenosis, aneurysm, dissection, or acute vascular occlusion.   1516 CT Head w/o Contrast  No acute intracranial hemorrhage.   1640 Spoke with hospitalist team, planning for observation.  Will defer MRI to the morning.  Will reach out to stroke neurology per hospitalist request for recommendations regarding any  therapeutic/other interventions.   1650 Spoke with Dr. Whitmore with stroke neurology.  They will follow peripherally.  If MRI positive for stroke would have discussion regarding antiplatelets.  Though, based on history currently, would not otherwise plan for antiplatelet if MRI is negative.  Stroke neurology referral placed.       Encounter Diagnoses:  Final diagnoses:   Blurred vision   Facial paresthesia       Final disposition: [facility] observation    Jakub Flowers MD  10/22/2024  3:17 PM   Emergency Medicine  [facility]      Jakub Flowers MD  10/22/24 [id number]

## 2024-10-22 NOTE — H&P
[facility]    History and Physical - Hospitalist Service       Date of Admission:  10/22/2024    Assessment & Plan    Sigrid Mcneill is a 74 year old female with past medical hx of T2DM and HTN admitted on 10/22/2024. She presents for intermittent bilateral blurry vision.    Blurred vision, bilateral, resolved  Facial paresthesia    2 episodes of bilateral blurry vision over 1 week PTA. Both episodes lasted ~30 minutes. One episode had concomitant word finding difficulty, and the last episode had concomitant right facial tingling. Had optometry appointment 10/22 who referred her to ER for TIA workup. CT head and CTA negative.    - Stroke neuro consulted  - Aspirin 325 and Plavix 300 given once. Await further recs from stroke neuro  - MRI in am  - Echo in am    Laryngopharyngeal reflux  Endorses sensation of food getting stuck in throat for 3-4 months. Endorses hoarseness and coughing after eating and drinking but no overt choking or aspiration events. Follows with otolaryngology, last visit 9/4. Flexible endoscopy performed 7/17/24 and showed laryngopharyngeal reflux.  - Continued PTA Omeprazole 20 BID  - SLP consulted    Type 2 diabetes mellitus without complication  A1c 6.8% on 9/13.  - Continued PTA Metformin 1000 Qam  - sliding scale insulin  - Consistent carb diet    Hypertension  - Held PTA Losartan 25 for permissive HTN    CKD stage 2  Creatinine 1.01 on admission, baseline 0.8-0.9.         Observation Goals: -diagnostic tests and consults completed and resulted, -vital signs normal or at patient baseline, -returns to baseline functional status, Nurse to notify provider when observation goals have been met and patient is ready for discharge.  Diet: Combination Diet Moderate Consistent Carb (60 g CHO per Meal) Diet  DVT Prophylaxis: Pneumatic Compression Devices  Lebron Catheter: Not present  Lines: None     Cardiac Monitoring: ACTIVE order. Indication: Stroke, acute (48  "hours)  Code Status: No CPR- Do NOT Intubate    Clinically Significant Risk Factors Present on Admission                   # Hypertension: Noted on problem list        # DMII: A1C = 6.8 % (Ref range: 0.0 - 5.6 %) within past 6 months    # Overweight: Estimated body mass index is 26.24 kg/m  as calculated from the following:    Height as of this encounter: 1.676 m (5' 6\").    Weight as of this encounter: 73.8 kg (162 lb 9.6 oz).              Disposition Plan     Medically Ready for Discharge: Anticipated Tomorrow         The patient's care was discussed with the Attending Physician, Dr. Grewal and Patient.    Alessio Chandra PA-C  Hospitalist Service  [facility]  Securely message with [employer] (more info)  Text page via [facility] Paging/Directory     ______________________________________________________________________    Chief Complaint   Blurry vision    History is obtained from the patient    History of Present Illness   Sigrid Mcneill is a 74 year old female who presents for intermittent blurry vision.    Patient presents for 2 episodes of bilateral blurry vision over the course of the last week.  The first episode was at Wednesday yesterday and she had bilateral blurry vision with difficulty processing written words for roughly 30 minutes that self resolved.  She had another episode yesterday around 11 AM with bilateral blurry vision, right worse than left, as well as numbness and tingling of the right side of her face.  This again lasted roughly 30 minutes.  She had an ophthalmologist appointment today, and they referred her to the ER for a stroke workup.  Patient is entirely asymptomatic on arrival.    Patient denies fevers, vomiting, chest pain, dyspnea, extremity weakness, extremity numbness, extremity tingling, eye pain, ear pain, hearing changes, recent falls.  She was recently driving on her way to [place].  She turned around from [place] yesterday after she had the " second episode of blurry vision.  She arrived back home last night.  She denies shortness of breath or unilateral calf swelling.    Past Medical History    Past Medical History:   Diagnosis Date    Benign essential hypertension 10/23/2012    Esophageal reflux 01/06/2006    Hypertension     resolved with weight loss       Past Surgical History   Past Surgical History:   Procedure Laterality Date    lumbar disc surgery  1986       Prior to Admission Medications   Prior to Admission Medications   Prescriptions Last Dose Informant Patient Reported? Taking?   CLARITIN 10 MG OR TABS 10/22/2024 at am Self Yes Yes   Sig: Take 10 mg by mouth daily.   Turmeric 500 MG CAPS 10/22/2024 at am Self Yes Yes   Sig: Take 1 capsule by mouth daily.   losartan (COZAAR) 25 MG tablet 10/22/2024 at am Self No Yes   Sig: Take 1 tablet (25 mg) by mouth daily.   magnesium 250 MG tablet 10/22/2024 at am Self Yes Yes   Sig: Take 1 tablet by mouth daily.   metFORMIN (GLUCOPHAGE XR) 500 MG 24 hr tablet 10/22/2024 at am Self No Yes   Sig: TAKE 2 TABLETS BY MOUTH ONCE DAILY WITH BREAKFAST   omeprazole (PRILOSEC OTC) 20 MG EC tablet 10/22/2024 at am Self Yes Yes   Sig: Take 20 mg by mouth 2 times daily.   omeprazole (PRILOSEC) 40 MG DR capsule dc-not taking this strength at taking otc Self No No   Sig: Take 1 capsule (40 mg) by mouth daily   simethicone (MYLICON) 80 MG chewable tablet Past Week at prn Self No Yes   Sig: Take 1 tablet (80 mg) by mouth every 6 hours as needed for flatulence or cramping      Facility-Administered Medications: None      2023 UPDATE: these sections are not required for  billing, but can be added when medically relevant     Physical Exam   Vital Signs: Temp: 98  F (36.7  C) Temp src: Oral BP: (!) 152/93 Pulse: 70   Resp: 15 SpO2: 95 % O2 Device: None (Room air)    Weight: 162 lbs 9.6 oz    Constitutional: Alert, oriented, cooperative, in no acute distress, appears nontoxic.  HENT: Oropharynx is clear and moist. No  evidence of cranial trauma. Normocephalic. Eyes anicteric. EOM intact. PERRLA  Cardiovascular: Regular rate and rhythm, normal S1 and S2, and no murmur noted. No lower extremity edema. No calf tenderness.  Respiratory: Clear to auscultation bilaterally with no adventitious breath sounds.   GI: Soft, non-tender, normal bowel sounds, no hepatosplenomegaly, no masses appreciated.  Musculoskeletal: Normal muscle bulk and tone. FROM in all extremities   Skin: Warm and dry, no rashes on exposed skin.   Psych: Normal affect and speech.  Neurologic: Cranial nerves 2-12 are grossly intact. A&Ox4. No pronator drift. No dysarthria. Strength 5/5 in all extremities. Finger to nose intact bilaterally. Sensation intact bilaterally. Tongue midline.       Medical Decision Making       60 MINUTES SPENT BY ME on the date of service doing chart review, history, exam, documentation & further activities per the note.      Data     I have personally reviewed the following data over the past 24 hrs:    6.7  \   13.5   / 273     141 107 11.3 /  107 (H)   4.8 28 1.01 (H) \       Imaging results reviewed over the past 24 hrs:   Recent Results (from the past 24 hour(s))   CTA Head Neck with Contrast    Narrative    EXAM: CTA HEAD NECK W CONTRAST, CT HEAD W/O CONTRAST  10/22/2024 2:50  PM     HISTORY:  74F, right facial paresthesias on/off x1 week. Bilat blurry  vision.       COMPARISON:  No prior similar studies    TECHNIQUE:  HEAD CT:Using multidetector thin collimation helical acquisition  technique, axial, coronal and sagittal CT images from the skull base  to the vertex were obtained without intravenous contrast.   (topogram) image(s) also obtained and reviewed.  HEAD and NECK CTA: During rapid bolus intravenous injection of  nonionic contrast material, axial images were obtained using thin  collimation multidetector helical technique from the base of the upper  aortic arch through the [place]. This CT angiogram data  was  reconstructed at thin intervals with mild overlap. Images were sent to  the 3D workstation, and 3D reconstructions were obtained. The axial  source images, multiplanar reformations, 3D reconstructions in both  maximum intensity projection display and volume rendered models were  reviewed, with reconstructions performed by the technologist and the  radiologist.    CONTRAST: 67mL [id number]    FINDINGS:    Head CT demonstrates no intracranial hemorrhage, mass effect, or  midline shift. Gray/white matter differentiation in both cerebral  hemispheres is preserved. Ventricles are proportionate to the cerebral  sulci. The basal cisterns are clear. Periventricular and subcortical  hypoattenuation, compatible with chronic small vessel ischemic  disease. Mild generalized cerebral atrophy.    The bony calvaria and the bones of the skull base are normal. The  visualized portions of the paranasal sinuses and mastoid air cells are  clear    Head CTA demonstrates patent major intracranial arteries. No large  vessel occlusion, high-grade stenosis or aneurysm.    Neck CTA demonstrates no internal carotid artery stenosis. No  vertebral artery stenosis. Patent and conventional aortic arch  branching pattern.    No acute finding in the visualized neck soft tissues, or in the  superior mediastinum/thorax. Tiny hypodense bilateral thyroid nodules.      Impression    IMPRESSION:    1. Head CT demonstrates no acute intracranial pathology. Chronic small  vessel ischemic disease and generalized cerebral atrophy.   2. Head CTA demonstrates no aneurysm or stenosis of the major  intracranial arteries.   3. Neck CTA demonstrates no stenosis of the major cervical arteries.    MATTHIEU SCOTT MD         SYSTEM ID:  [id number]   CT Head w/o Contrast    Narrative    EXAM: CTA HEAD NECK W CONTRAST, CT HEAD W/O CONTRAST  10/22/2024 2:50  PM     HISTORY:  74F, right facial paresthesias on/off x1 week. Bilat blurry  vision.       COMPARISON:  No  prior similar studies    TECHNIQUE:  HEAD CT:Using multidetector thin collimation helical acquisition  technique, axial, coronal and sagittal CT images from the skull base  to the vertex were obtained without intravenous contrast.   (topogram) image(s) also obtained and reviewed.  HEAD and NECK CTA: During rapid bolus intravenous injection of  nonionic contrast material, axial images were obtained using thin  collimation multidetector helical technique from the base of the upper  aortic arch through the [place]. This CT angiogram data was  reconstructed at thin intervals with mild overlap. Images were sent to  the 3D workstation, and 3D reconstructions were obtained. The axial  source images, multiplanar reformations, 3D reconstructions in both  maximum intensity projection display and volume rendered models were  reviewed, with reconstructions performed by the technologist and the  radiologist.    CONTRAST: 67mL Isovue-[id number]    FINDINGS:    Head CT demonstrates no intracranial hemorrhage, mass effect, or  midline shift. Gray/white matter differentiation in both cerebral  hemispheres is preserved. Ventricles are proportionate to the cerebral  sulci. The basal cisterns are clear. Periventricular and subcortical  hypoattenuation, compatible with chronic small vessel ischemic  disease. Mild generalized cerebral atrophy.    The bony calvaria and the bones of the skull base are normal. The  visualized portions of the paranasal sinuses and mastoid air cells are  clear    Head CTA demonstrates patent major intracranial arteries. No large  vessel occlusion, high-grade stenosis or aneurysm.    Neck CTA demonstrates no internal carotid artery stenosis. No  vertebral artery stenosis. Patent and conventional aortic arch  branching pattern.    No acute finding in the visualized neck soft tissues, or in the  superior mediastinum/thorax. Tiny hypodense bilateral thyroid nodules.      Impression    IMPRESSION:    1.  Head CT demonstrates no acute intracranial pathology. Chronic small  vessel ischemic disease and generalized cerebral atrophy.   2. Head CTA demonstrates no aneurysm or stenosis of the major  intracranial arteries.   3. Neck CTA demonstrates no stenosis of the major cervical arteries.    MATTHIEU SCOTT MD         SYSTEM ID:  [id number]

## 2024-10-22 NOTE — CONSULTS
"  [facility]    Stroke Telephone Note    I was called by Axel Flowers on 10/22/24 regarding patient Sigrid Mcneill. The patient is a 74 year old female with past medical history of hypertension on losartan 25 mg daily, and diabetes on metformin, presented with transient episode of blurry vision and right facial numbness that lasted 30 minutes the day before.  The patient is being admitted to the hospital for TIA evaluation    Vitals  BP: (!) 152/93   Pulse: 70   Resp: 15   Temp: 98  F (36.7  C)   Weight: 73.8 kg (162 lb 9.6 oz)    Imaging Findings  CT head: No acute pathology  CTA head/neck: No LVO    Impression  Transient episode of blurry vision, and right facial numbness  Posterior circulation TIA (?)    Recommendations  MRI of the brain with and without contrast  Hemoglobin A1c, and lipid panel  Cardiac echo  Load with aspirin 325 mg once, as well as Plavix 300 mg once    My recommendations are based on the information provided over the phone by Sigrid Mcneill's in-person providers. They are not intended to replace the clinical judgment of her in-person providers. I was not requested to personally see or examine the patient at this time.     The Stroke Staff is Dr. Tinsley.    Karyn Whitmore MD  Vascular Neurology Fellow    To page me or covering stroke neurology team member, click here: [employer]  Choose \"On Call\" tab at top, then select \"NEUROLOGY/ALL SITES\" from middle drop-down box, press Enter, then look for \"stroke\" or \"telestroke\" for your site.    "

## 2024-10-22 NOTE — ED NOTES
"[facility]   Admission Handoff    The patient is Sigrid Mcneill, 74 year old who arrived in the ED by WALKED from home with a complaint of Eye Problem (Blurred vision for 30 minutes yesterday  resolved. Sent by eye doctor for TIA)  . The patient's current symptoms are a recurrence of a past episode and during this time the symptoms have decreased. In the ED, patient was diagnosed with   Final diagnoses:   Blurred vision   Facial paresthesia         Needed?: No    Allergies:  No Known Allergies    Past Medical Hx:   Past Medical History:   Diagnosis Date    Benign essential hypertension 10/23/2012    Esophageal reflux 01/06/2006    Hypertension     resolved with weight loss       Initial vitals were: BP: (!) 190/103  Pulse: 77  Temp: 98  F (36.7  C)  Resp: 18  Height: 167.6 cm (5' 6\")  Weight: 73.8 kg (162 lb 9.6 oz)  SpO2: 97 %   Recent vital Signs: BP (!) 152/93   Pulse 70   Temp 98  F (36.7  C) (Oral)   Resp 15   Ht 1.676 m (5' 6\")   Wt 73.8 kg (162 lb 9.6 oz)   SpO2 95%   BMI 26.24 kg/m      Elimination Status: Continent: Yes     Activity Level: Independent    Baseline Mental status: WDL  Current Mental Status changes: at basesline    Infection present or suspected this encounter: no  Sepsis suspected: No    Isolation type:     Bariatric equipment needed?: No    In the ED these meds were given:   Medications   aspirin (ASA) tablet 325 mg (has no administration in time range)   clopidogrel (PLAVIX) tablet 300 mg (has no administration in time range)   iopamidol (ISOVUE-370) solution 67 mL (67 mLs Intravenous $Given 10/22/24 1435)   sodium chloride 0.9 % bag 500mL for CT scan flush use (100 mLs Intravenous $Given 10/22/24 1435)       Drips running?  No    Home pump  No    Current LDAs: Peripheral IV: Site RAC; Gauge 18g  none     Results:   Labs/Imaging  Ordered and Resulted from Time of ED Arrival Up to the Time of Departure from the ED  Results for orders " placed or performed during the hospital encounter of 10/22/24 (from the past 24 hour(s))   CBC with platelets   Result Value Ref Range    WBC Count 6.7 4.0 - 11.0 10e3/uL    RBC Count 4.57 3.80 - 5.20 10e6/uL    Hemoglobin 13.5 11.7 - 15.7 g/dL    Hematocrit 41.2 35.0 - 47.0 %    MCV 90 78 - 100 fL    MCH 29.5 26.5 - 33.0 pg    MCHC 32.8 31.5 - 36.5 g/dL    RDW 14.0 10.0 - 15.0 %    Platelet Count 273 150 - 450 10e3/uL   Basic metabolic panel   Result Value Ref Range    Sodium 141 135 - 145 mmol/L    Potassium 4.8 3.4 - 5.3 mmol/L    Chloride 107 98 - 107 mmol/L    Carbon Dioxide (CO2) 28 22 - 29 mmol/L    Anion Gap 6 (L) 7 - 15 mmol/L    Urea Nitrogen 11.3 8.0 - 23.0 mg/dL    Creatinine 1.01 (H) 0.51 - 0.95 mg/dL    GFR Estimate 58 (L) >60 mL/min/1.73m2    Calcium 9.3 8.8 - 10.4 mg/dL    Glucose 107 (H) 70 - 99 mg/dL   CTA Head Neck with Contrast    Narrative    EXAM: CTA HEAD NECK W CONTRAST, CT HEAD W/O CONTRAST  10/22/2024 2:50  PM     HISTORY:  74F, right facial paresthesias on/off x1 week. Bilat blurry  vision.       COMPARISON:  No prior similar studies    TECHNIQUE:  HEAD CT:Using multidetector thin collimation helical acquisition  technique, axial, coronal and sagittal CT images from the skull base  to the vertex were obtained without intravenous contrast.   (topogram) image(s) also obtained and reviewed.  HEAD and NECK CTA: During rapid bolus intravenous injection of  nonionic contrast material, axial images were obtained using thin  collimation multidetector helical technique from the base of the upper  aortic arch through the [place] of [place]. This CT angiogram data was  reconstructed at thin intervals with mild overlap. Images were sent to  the 3D workstation, and 3D reconstructions were obtained. The axial  source images, multiplanar reformations, 3D reconstructions in both  maximum intensity projection display and volume rendered models were  reviewed, with reconstructions performed by the  technologist and the  radiologist.    CONTRAST: 67mL Isovue-370    FINDINGS:    Head CT demonstrates no intracranial hemorrhage, mass effect, or  midline shift. Gray/white matter differentiation in both cerebral  hemispheres is preserved. Ventricles are proportionate to the cerebral  sulci. The basal cisterns are clear. Periventricular and subcortical  hypoattenuation, compatible with chronic small vessel ischemic  disease. Mild generalized cerebral atrophy.    The bony calvaria and the bones of the skull base are normal. The  visualized portions of the paranasal sinuses and mastoid air cells are  clear    Head CTA demonstrates patent major intracranial arteries. No large  vessel occlusion, high-grade stenosis or aneurysm.    Neck CTA demonstrates no internal carotid artery stenosis. No  vertebral artery stenosis. Patent and conventional aortic arch  branching pattern.    No acute finding in the visualized neck soft tissues, or in the  superior mediastinum/thorax. Tiny hypodense bilateral thyroid nodules.      Impression    IMPRESSION:    1. Head CT demonstrates no acute intracranial pathology. Chronic small  vessel ischemic disease and generalized cerebral atrophy.   2. Head CTA demonstrates no aneurysm or stenosis of the major  intracranial arteries.   3. Neck CTA demonstrates no stenosis of the major cervical arteries.    MATTHIEU SCOTT MD         SYSTEM ID:  [id number]   CT Head w/o Contrast    Narrative    EXAM: CTA HEAD NECK W CONTRAST, CT HEAD W/O CONTRAST  10/22/2024 2:50  PM     HISTORY:  74F, right facial paresthesias on/off x1 week. Bilat blurry  vision.       COMPARISON:  No prior similar studies    TECHNIQUE:  HEAD CT:Using multidetector thin collimation helical acquisition  technique, axial, coronal and sagittal CT images from the skull base  to the vertex were obtained without intravenous contrast.   (topogram) image(s) also obtained and reviewed.  HEAD and NECK CTA: During rapid bolus  intravenous injection of  nonionic contrast material, axial images were obtained using thin  collimation multidetector helical technique from the base of the upper  aortic arch through the [place] of [place]. This CT angiogram data was  reconstructed at thin intervals with mild overlap. Images were sent to  the 3D workstation, and 3D reconstructions were obtained. The axial  source images, multiplanar reformations, 3D reconstructions in both  maximum intensity projection display and volume rendered models were  reviewed, with reconstructions performed by the technologist and the  radiologist.    CONTRAST: 67mL Isovue-370    FINDINGS:    Head CT demonstrates no intracranial hemorrhage, mass effect, or  midline shift. Gray/white matter differentiation in both cerebral  hemispheres is preserved. Ventricles are proportionate to the cerebral  sulci. The basal cisterns are clear. Periventricular and subcortical  hypoattenuation, compatible with chronic small vessel ischemic  disease. Mild generalized cerebral atrophy.    The bony calvaria and the bones of the skull base are normal. The  visualized portions of the paranasal sinuses and mastoid air cells are  clear    Head CTA demonstrates patent major intracranial arteries. No large  vessel occlusion, high-grade stenosis or aneurysm.    Neck CTA demonstrates no internal carotid artery stenosis. No  vertebral artery stenosis. Patent and conventional aortic arch  branching pattern.    No acute finding in the visualized neck soft tissues, or in the  superior mediastinum/thorax. Tiny hypodense bilateral thyroid nodules.      Impression    IMPRESSION:    1. Head CT demonstrates no acute intracranial pathology. Chronic small  vessel ischemic disease and generalized cerebral atrophy.   2. Head CTA demonstrates no aneurysm or stenosis of the major  intracranial arteries.   3. Neck CTA demonstrates no stenosis of the major cervical arteries.    MATTHIEU SCOTT MD         SYSTEM ID:   [id number]       For the majority of the shift this patient's behavior was Green     Cardiac Rhythm: Normal Sinus  Pt needs tele? No  Skin/wound Issues: None    Code Status: Full Code    Pain control: pt had none    Nausea control: pt had none    Abnormal labs/tests/findings requiring intervention:     Patient tested for COVID 19 prior to admission: NO     OBS brochure/video discussed/provided to patient/family: Yes     Family present during ED course? No     Family Comments/Social Situation comments:     Tasks needing completion: None    Vanessa Zelaya RN

## 2024-10-22 NOTE — PROGRESS NOTES
Reviewing chart due to high probability of admit to Med/Surg unit.     Diego Mujica RN on 10/22/2024 at 5:47 PM

## 2024-10-23 ENCOUNTER — APPOINTMENT (OUTPATIENT)
Dept: SPEECH THERAPY | Facility: CLINIC | Age: 74
End: 2024-10-23
Payer: COMMERCIAL

## 2024-10-23 ENCOUNTER — ORDERS ONLY (AUTO-RELEASED) (OUTPATIENT)
Dept: MEDSURG UNIT | Facility: CLINIC | Age: 74
End: 2024-10-23

## 2024-10-23 ENCOUNTER — APPOINTMENT (OUTPATIENT)
Dept: CARDIOLOGY | Facility: CLINIC | Age: 74
End: 2024-10-23
Payer: COMMERCIAL

## 2024-10-23 VITALS
OXYGEN SATURATION: 95 % | HEART RATE: 78 BPM | DIASTOLIC BLOOD PRESSURE: 76 MMHG | BODY MASS INDEX: 25.72 KG/M2 | WEIGHT: 160.05 LBS | TEMPERATURE: 96.9 F | SYSTOLIC BLOOD PRESSURE: 160 MMHG | HEIGHT: 66 IN | RESPIRATION RATE: 16 BRPM

## 2024-10-23 DIAGNOSIS — G45.9 TIA (TRANSIENT ISCHEMIC ATTACK): ICD-10-CM

## 2024-10-23 LAB
ANION GAP SERPL CALCULATED.3IONS-SCNC: 8 MMOL/L (ref 7–15)
BUN SERPL-MCNC: 10.4 MG/DL (ref 8–23)
CALCIUM SERPL-MCNC: 9.1 MG/DL (ref 8.8–10.4)
CHLORIDE SERPL-SCNC: 107 MMOL/L (ref 98–107)
CREAT SERPL-MCNC: 0.94 MG/DL (ref 0.51–0.95)
EGFRCR SERPLBLD CKD-EPI 2021: 63 ML/MIN/1.73M2
GLUCOSE BLDC GLUCOMTR-MCNC: 117 MG/DL (ref 70–99)
GLUCOSE BLDC GLUCOMTR-MCNC: 127 MG/DL (ref 70–99)
GLUCOSE SERPL-MCNC: 129 MG/DL (ref 70–99)
HCO3 SERPL-SCNC: 24 MMOL/L (ref 22–29)
HOLD SPECIMEN: NORMAL
INR PPP: 1.04 (ref 0.85–1.15)
LVEF ECHO: NORMAL
POTASSIUM SERPL-SCNC: 4.4 MMOL/L (ref 3.4–5.3)
SODIUM SERPL-SCNC: 139 MMOL/L (ref 135–145)

## 2024-10-23 PROCEDURE — 85610 PROTHROMBIN TIME: CPT

## 2024-10-23 PROCEDURE — G0378 HOSPITAL OBSERVATION PER HR: HCPCS

## 2024-10-23 PROCEDURE — 82962 GLUCOSE BLOOD TEST: CPT

## 2024-10-23 PROCEDURE — 250N000013 HC RX MED GY IP 250 OP 250 PS 637: Performed by: PHYSICIAN ASSISTANT

## 2024-10-23 PROCEDURE — G0426 INPT/ED TELECONSULT50: HCPCS | Mod: G0 | Performed by: PHYSICIAN ASSISTANT

## 2024-10-23 PROCEDURE — 36415 COLL VENOUS BLD VENIPUNCTURE: CPT

## 2024-10-23 PROCEDURE — 93306 TTE W/DOPPLER COMPLETE: CPT | Mod: 26 | Performed by: INTERNAL MEDICINE

## 2024-10-23 PROCEDURE — 250N000013 HC RX MED GY IP 250 OP 250 PS 637

## 2024-10-23 PROCEDURE — 250N000013 HC RX MED GY IP 250 OP 250 PS 637: Performed by: STUDENT IN AN ORGANIZED HEALTH CARE EDUCATION/TRAINING PROGRAM

## 2024-10-23 PROCEDURE — 80048 BASIC METABOLIC PNL TOTAL CA: CPT

## 2024-10-23 PROCEDURE — 999N000226 HC STATISTIC SLP IP EVAL DEFER: Performed by: SPEECH-LANGUAGE PATHOLOGIST

## 2024-10-23 PROCEDURE — 999N000208 ECHOCARDIOGRAM COMPLETE

## 2024-10-23 PROCEDURE — 99239 HOSP IP/OBS DSCHRG MGMT >30: CPT | Performed by: FAMILY MEDICINE

## 2024-10-23 RX ORDER — ASPIRIN 81 MG/1
81 TABLET ORAL DAILY
Status: DISCONTINUED | OUTPATIENT
Start: 2024-10-23 | End: 2024-10-23 | Stop reason: HOSPADM

## 2024-10-23 RX ORDER — ASPIRIN 81 MG/1
81 TABLET ORAL DAILY
Qty: 90 TABLET | Refills: 3 | Status: SHIPPED | OUTPATIENT
Start: 2024-10-24

## 2024-10-23 RX ADMIN — PANTOPRAZOLE SODIUM 40 MG: 40 TABLET, DELAYED RELEASE ORAL at 08:29

## 2024-10-23 RX ADMIN — ASPIRIN 81 MG: 81 TABLET, COATED ORAL at 09:18

## 2024-10-23 RX ADMIN — ACETAMINOPHEN 650 MG: 325 TABLET ORAL at 08:29

## 2024-10-23 RX ADMIN — METFORMIN ER 500 MG 1000 MG: 500 TABLET ORAL at 08:29

## 2024-10-23 ASSESSMENT — ACTIVITIES OF DAILY LIVING (ADL)
ADLS_ACUITY_SCORE: 0
ADLS_ACUITY_SCORE: 31
ADLS_ACUITY_SCORE: 0

## 2024-10-23 NOTE — DISCHARGE SUMMARY
[facility] Discharge Summary    Sigrid Mcneill MRN# [id number]   Age: 74 year old YOB: 1950     Date of Admission:  10/22/2024  Date of Discharge::  10/23/2024  Admitting Physician:  Blaze Grewal MD  Discharge Physician:  Bassem Meza MD             Admission Diagnoses:   Blurred vision [H53.8]  Facial paresthesia [R20.2]          Principle Discharge Diagnosis:       Blurred vision, bilateral with facial paresthesia, resolved - suspect TIA but not clear-cut picture     See hospital course for further active diagnoses addressed during this admission.                 Medications Prior to Admission:     Medications Prior to Admission   Medication Sig Dispense Refill Last Dose/Taking    CLARITIN 10 MG OR TABS Take 10 mg by mouth daily.   10/22/2024 at am    losartan (COZAAR) 25 MG tablet Take 1 tablet (25 mg) by mouth daily. 100 tablet 4 10/22/2024 at am    magnesium 250 MG tablet Take 1 tablet by mouth daily.   10/22/2024 at am    metFORMIN (GLUCOPHAGE XR) 500 MG 24 hr tablet TAKE 2 TABLETS BY MOUTH ONCE DAILY WITH BREAKFAST 200 tablet 4 10/22/2024 at am    omeprazole (PRILOSEC OTC) 20 MG EC tablet Take 20 mg by mouth 2 times daily.   10/22/2024 at am    omeprazole (PRILOSEC) 40 MG DR capsule Take 1 capsule (40 mg) by mouth daily 90 capsule 1 dc-not taking this strength at taking otc    simethicone (MYLICON) 80 MG chewable tablet Take 1 tablet (80 mg) by mouth every 6 hours as needed for flatulence or cramping 120 tablet 11 Past Week at prn    Turmeric 500 MG CAPS Take 1 capsule by mouth daily.   10/22/2024 at am             Discharge Medications:     Current Discharge Medication List        START taking these medications    Details   aspirin 81 MG EC tablet Take 1 tablet (81 mg) by mouth daily.  Qty: 90 tablet, Refills: 3    Associated Diagnoses: TIA (transient ischemic attack)           CONTINUE these medications which have NOT CHANGED    Details   CLARITIN 10 MG OR TABS Take 10 mg  by mouth daily.      losartan (COZAAR) 25 MG tablet Take 1 tablet (25 mg) by mouth daily.  Qty: 100 tablet, Refills: 4    Associated Diagnoses: Type 2 diabetes mellitus without complication, without long-term current use of insulin (H); Benign essential hypertension      magnesium 250 MG tablet Take 1 tablet by mouth daily.      metFORMIN (GLUCOPHAGE XR) 500 MG 24 hr tablet TAKE 2 TABLETS BY MOUTH ONCE DAILY WITH BREAKFAST  Qty: 200 tablet, Refills: 4    Associated Diagnoses: Type 2 diabetes mellitus without complication, without long-term current use of insulin (H)      omeprazole (PRILOSEC OTC) 20 MG EC tablet Take 20 mg by mouth 2 times daily.      omeprazole (PRILOSEC) 40 MG DR capsule Take 1 capsule (40 mg) by mouth daily  Qty: 90 capsule, Refills: 1    Comments: Take 40 mg qday 1/2 h before dinner  Associated Diagnoses: Gastroesophageal reflux disease without esophagitis      simethicone (MYLICON) 80 MG chewable tablet Take 1 tablet (80 mg) by mouth every 6 hours as needed for flatulence or cramping  Qty: 120 tablet, Refills: 11    Associated Diagnoses: Bloating      Turmeric 500 MG CAPS Take 1 capsule by mouth daily.                   Consultations:   Tele-stroke service           Brief History of Illness from time of admission:     From Admission H+P:   Sigrid Mcneill is a 74 year old female who presents for intermittent blurry vision.     Patient presents for 2 episodes of bilateral blurry vision over the course of the last week.  The first episode was at Wednesday yesterday and she had bilateral blurry vision with difficulty processing written words for roughly 30 minutes that self resolved.  She had another episode yesterday around 11 AM with bilateral blurry vision, right worse than left, as well as numbness and tingling of the right side of her face.  This again lasted roughly 30 minutes.  She had an ophthalmologist appointment today, and they referred her to the ER for a stroke workup.  Patient is  "entirely asymptomatic on arrival.     Patient denies fevers, vomiting, chest pain, dyspnea, extremity weakness, extremity numbness, extremity tingling, eye pain, ear pain, hearing changes, recent falls.  She was recently driving on her way to [place].  She turned around from [place] yesterday after she had the second episode of blurry vision.  She arrived back home last night.  She denies shortness of breath or unilateral calf swelling.            TODAY:     Subjective:  Doing well. Vision feels completely back to baseline.  Swallowing at long-standing baseline.  No trouble with speech or comprehension.  Say she maybe does have perhaps the slightest bit of dizziness still but so mild that it's hard to say if it's even there.  Overall feels \"completely normal\", steady on her feet and feels ready and requesting discharge home today..    No pain.         ROS:   ROS: 10 point ROS neg other than the symptoms noted above in the HPI.     BP (!) 150/61 (BP Location: Right arm)   Pulse 78   Temp 97.7  F (36.5  C) (Oral)   Resp 16   Ht 1.676 m (5' 6\")   Wt 72.6 kg (160 lb 0.9 oz)   SpO2 92%   BMI 25.83 kg/m     EXAM:   GENERAL APPEARANCE ADULT: Alert, no acute distress, oriented x 3  EYES: PERRL, EOM normal, conjunctiva and lids normal, EOM normal  HENT: oral mucous membranes moist, head atraumatic and normocephalic  NECK: No adenopathy,masses or thyromegaly, supple  CV: regular rate and rhythm no murmur  Pulm: clear to auscultation bilaterally  Abdomen: soft, non-tender, no masses, no masses, normal bowel sounds.  MS: extremities normal, no peripheral edema  SKIN: no suspicious lesions or rashes  NEURO: Alert, oriented, speech and mentation normal, Cranial nerves 2-12 are normal., Strength normal and symmetrical in upper and lower extremities.  Sensation to light touch intact throughout upper and lower extremities bilaterally.   Reflexes 2+ and symmetrical at biceps, triceps, knees and ankles. Finger to nose and " heel to shin testing is normal.  PSYCH: mentation appears normal, affect and mood normal     Hospital Course:      Sigrid Mcneill is a 74 year old female with past medical hx of T2DM and HTN admitted on 10/22/2024. She presents for intermittent bilateral blurry vision.     Blurred vision, bilateral with facial paresthesia, resolved - suspect TIA but not clear-cut picture   -  2 episodes of bilateral blurry vision over 1 week PTA. Both episodes lasted ~30 minutes. One episode had concomitant word finding difficulty, and the last episode had concomitant right facial tingling. Had opthalmology appointment 10/22 who reportedly evaluated her vision and said she had a normal eye exam and then referred her to ER for TIA workup. CT head and CTA negative.  MRI brain negative for any acute change, just generalized atrophy and presumed microvascular ischemic changes.  .    - Aspirin 325 and Plavix 300 given once. Await further recs from stroke neuro  - Echo unremarkable 10/23   - Stroke neuro consulted - did tele-visit and recommended the following:   - Long term goal normotension and goal BP <130/80 with tighter control associated with decreased overall CV risk, if tolerated.  - Daily aspirin 81 mg for stroke prevention.  Discontinue if alternative etiology for her symptoms is found  - Statin: given uncertain diagnosis of TIA and LDL of 85, OK to hold off on starting statin for now. Patient prefers not to start a statin unless it is strongly indicated. For now will recommend to focus on dietary changes and PCP to follow LDL.   - Discharge with Zio patch to evaluate for atrial fibrillation (ordered but stroke neuro)   - patient doing well and back to baseline.  Ok for discharge home 10/23. Follow-up with primary care provider in 1 week and with general neurology in 6-8 weeks as per stroke recommendations.          Scattered T2/FLAIR hyperintensities on MRI -   Per stroke neuro :   while small vessel disease is possible,  "the distribution is somewhat atypical and alternative etiologies should be considered such as demyelinating disease. No evidence of acute pathology however.  - agree nothing in current picture or reported symptoms that would point toward demyelinating disease.  Per radiology looked most like small vessel ischemic disease.  Consider further work-up if she develops further/more concerning symptoms but no intervention for now.       Laryngopharyngeal reflux  Endorses sensation of food getting stuck in throat for 3-4 months. Endorses hoarseness and coughing after eating and drinking but no overt choking or aspiration events. Follows with otolaryngology, last visit 9/4. Flexible endoscopy performed 7/17/24 and showed laryngopharyngeal reflux.  - Continued PTA Omeprazole 20 twice daily   - SLP consulted 0- they evaluated patient but determined no new changes to her long-standing symptoms with ongoing plan for treatment and follow-up with ENT in place.  No evaluation done here, patient in agreement.          Type 2 diabetes mellitus without complication  A1c 6.8% on 9/13.  - Continued PTA Metformin 1000 Qam  - sliding scale insulin prn while here  - Consistent carb diet   - continue home regimen on discharge      Hypertension  - Held PTA Losartan 25 for permissive hypertension, resumed on discharge        CKD stage 2  Creatinine 1.01 on admission, baseline 0.8-0.9.               DVT Prophylaxis: Pneumatic Compression Devices  Lebron Catheter: Not present  Lines: None     Cardiac Monitoring: ACTIVE order. Indication: Stroke, acute (48 hours)  Code Status: No CPR- Do NOT Intubate        Clinically Significant Risk Factors Present on Admission                   # Hypertension: Noted on problem list          # DMII: A1C = 6.8 % (Ref range: 0.0 - 5.6 %) within past 6 months    # Overweight: Estimated body mass index is 26.24 kg/m  as calculated from the following:    Height as of this encounter: 1.676 m (5' 6\").    Weight as of " this encounter: 73.8 kg (162 lb 9.6 oz).                       Discharge Instructions and Follow-Up:      Discharge diet: Orders Placed This Encounter      Combination Diet Moderate Consistent Carb (60 g CHO per Meal) Diet       Discharge activity: Activity as tolerated   Discharge follow-up: Follow-up with your primary care provider in about 1 week  Follow-up with stroke neurology in 6-8 weeks   You will be sent a zio patch (heart monitor) to wear within the next 1-2 weeks           Discharge Disposition:     Discharged to home       Attestation:  Amount of time performed on this discharge summary: 65 minutes.    Bassem Meza MD

## 2024-10-23 NOTE — PLAN OF CARE
Pt discharged home at 1510.  Neuros WDL, no vision changes.  Discharge instructions reviewed with pt, copy provided.  Reviewed stroke education.  Prescription sent to pharmacy.  All belongings sent home with pt.

## 2024-10-23 NOTE — PLAN OF CARE
Problem: Adult Inpatient Plan of Care  Goal: Plan of Care Review  Description: The Plan of Care Review/Shift note should be completed every shift.  The Outcome Evaluation is a brief statement about your assessment that the patient is improving, declining, or no change.  This information will be displayed automatically on your shift  note.  Outcome: Progressing  Flowsheets (Taken 10/23/2024 2957)  Plan of Care Reviewed With: patient  Overall Patient Progress: improving     Problem: Stroke, Ischemic (Includes Transient Ischemic Attack)  Goal: Optimal Cerebral Tissue Perfusion  Outcome: Progressing   Goal Outcome Evaluation:       Patient denies blurry vision, neuros intact.  MRI negative for any acute intracranial process, shows normal age-related changes.

## 2024-10-23 NOTE — PROGRESS NOTES
"[facility] ADMISSION NOTE    Patient admitted to room 2311 at approximately 1852 via wheelchair from emergency room. Patient was accompanied by transport tech.     Admission hand off note from ED nurse was reviewed.  No verbal report received.    Patient ambulated to bed with stand-by assist. Patient alert and oriented X 3. The patient is not having any pain.  . Admission vital signs: Blood pressure (!) 152/93, pulse 70, temperature 98  F (36.7  C), temperature source Oral, resp. rate 15, height 1.676 m (5' 6\"), weight 73.8 kg (162 lb 9.6 oz), SpO2 95%, not currently breastfeeding. Patient was oriented to plan of care, call light, bed controls, tv, telephone, bathroom, and visiting hours.     Risk Assessment    The following safety risks were identified during admission: none. Yellow risk band applied: NO.     Skin Initial Assessment    This writer admitted this patient and completed a full skin assessment and Leonel score in the Adult PCS flowsheet.   Photo documentation of skin problem and/or wound competed via [employer] application (located under Media):  N/A    Appropriate interventions initiated as needed.     Patient declined secondary skin check.        Education    Patient has a [place] to Observation order: Yes  Observation education completed and documented: Yes      Stacy Chong RN      "

## 2024-10-23 NOTE — PROGRESS NOTES
Provided patient with written information regarding risk factors for stroke and stroke warning signs - BE FAST.

## 2024-10-23 NOTE — CONSULTS
"[facility]    Stroke Consult Note    Reason for Consult:  possible TIA    Chief Complaint: Eye Problem (Blurred vision for 30 minutes yesterday  resolved. Sent by eye doctor for TIA)       JAKY Mcneill is a 74 year old female with PMH HTN, DM2, presented with an episode of transient blurred vision of both eyes and R facial heaviness (no drooping or numbness) lasting ~30 mins. Went to eye doctor yesterday morning and no primary ocular issue was identified. She additionally reported another episode about a week ago of blurred vision in both eyes and difficulty processing words that she was reading lasting <10 mins. Presenting /103. She states she sometimes gets \"eye fatigue\" and is L eye dominant but these episodes felt different from what she has experienced in the past. She describesa a similar episode 4 years ago when she was working on a computer a lot. She denies any headache with any of these epid no reported history of migraines. She does report a history of what sounds like BPPV, typically improves with epley maneuvers.      TIA Evaluation Summarized    MRI and/or Head CT MRI: scattered T2/FLAIR hyperintensities, no acute stroke   Intracranial Vasculature CTA: no significant stenosis   Cervical Vasculature CTA: no significant stenosis     Echocardiogram EF 60-65%, no regional WMAs, normal LA size   EKG/Telemetry No available to review in chart   Other Testing Not Applicable      LDL 10/22/2024: 85 mg/dL   A1C 9/13/2024: 6.8 %       ABCD2 Patients Score   Age >= 60 years 1 point 1   Blood Pressure    SBP >= 140 or DBP >=  90    1 point 1   Clinical Features    - Unilateral weakness    - Speech disturbance w/o weakness    - Other    2 points  1 point    0 points 0   Duration of symptoms    >= 60 minutes    10-59 minutes    < 10 minutes   2 points  1 point  0 points 1   Diabetes  1 point 1   Patient s ABCD2 Score (0-7) = 4       Impression  Possible TIA " "though history is atypical with report of bilateral blurred vision, facial heaviness rather than numbness  Scattered T2/FLAIR hyperintensities on MRI - while small vessel disease is possible, the distribution is somewhat atypical and alternative etiologies should be considered such as demyelinating disease. No evidence of acute pathology however.    Recommendations   - Goal normotension and goal BP <130/80 with tighter control associated with decreased overall CV risk, if tolerated.  - Daily aspirin 81 mg for stroke prevention, unless alternative etiology for her symptoms is defined in which case this could be discontinued  - Statin: given uncertain diagnosis of TIA and LDL of 85, OK to hold off on starting statin for now. Patient prefers not to start a statin unless it is strongly indicated. For now will recommend to focus on dietary changes and PCP to follow LDL.   - 24-hour Telemetry  - Discharge with Zio patch to evaluate for atrial fibrillation (ordered)  - Bedside Glucose Monitoring  - PT/OT/SLP as indicated  - Stroke Education  - Euthermia, Euglycemia  - Mediterranean diet can be beneficial for overall decreased cardiovascular risk, please print hand out for patient at discharge       Patient Follow-up    - in 6-8 weeks with general neurology ([phone]) (ordered) - can evaluate for alternative causes of abnormal MRI as well    Thank you for this consult. Stroke neurology will sign off.    .Damari Umana PA-C  Vascular Neurology    To page me or covering stroke neurology team member, click here: AMCOM  Choose \"On Call\" tab at top, then select \"NEUROLOGY/ALL SITES\" from middle drop-down box, press Enter, then look for \"stroke\" or \"telestroke\" for your site.  _____________________________________________________    Clinically Significant Risk Factors Present on Admission                   # Hypertension: Noted on problem list        # DMII: A1C = 6.8 % (Ref range: 0.0 - 5.6 %) within past 6 months    # " "Overweight: Estimated body mass index is 25.83 kg/m  as calculated from the following:    Height as of this encounter: 1.676 m (5' 6\").    Weight as of this encounter: 72.6 kg (160 lb 0.9 oz).              Past Medical History    Past Medical History:   Diagnosis Date    Benign essential hypertension 10/23/2012    Esophageal reflux 01/06/2006    Hypertension     resolved with weight loss     Medications   Home Meds  Prior to Admission medications    Medication Sig Start Date End Date Taking? Authorizing Provider   CLARITIN 10 MG OR TABS Take 10 mg by mouth daily.   Yes Reported, Patient   losartan (COZAAR) 25 MG tablet Take 1 tablet (25 mg) by mouth daily. 9/13/24  Yes Idalia Gage MD   magnesium 250 MG tablet Take 1 tablet by mouth daily.   Yes Reported, Patient   metFORMIN (GLUCOPHAGE XR) 500 MG 24 hr tablet TAKE 2 TABLETS BY MOUTH ONCE DAILY WITH BREAKFAST 9/13/24  Yes Idalia Gage MD   omeprazole (PRILOSEC OTC) 20 MG EC tablet Take 20 mg by mouth 2 times daily.   Yes Reported, Patient   omeprazole (PRILOSEC) 40 MG DR capsule Take 1 capsule (40 mg) by mouth daily 7/17/24  Yes Sandeep Guardado MD   simethicone (MYLICON) 80 MG chewable tablet Take 1 tablet (80 mg) by mouth every 6 hours as needed for flatulence or cramping 7/23/21  Yes Idalia Gage MD   Turmeric 500 MG CAPS Take 1 capsule by mouth daily.   Yes Reported, Patient       Scheduled Meds  Current Facility-Administered Medications   Medication Dose Route Frequency Provider Last Rate Last Admin    aspirin EC tablet 81 mg  81 mg Oral Daily More, Damari HAYWARD PA-C   81 mg at 10/23/24 0918    insulin aspart (NovoLOG) injection (RAPID ACTING)  1-7 Units Subcutaneous TID AC Alessio Chandra PA-C        insulin aspart (NovoLOG) injection (RAPID ACTING)  1-5 Units Subcutaneous At Bedtime Alessio Chandra PA-C        [Held by provider] losartan (COZAAR) tablet 25 mg  25 mg Oral Daily Alessio Chandra PA-C        metFORMIN (GLUCOPHAGE " XR) 24 hr tablet 1,000 mg  1,000 mg Oral Daily with breakfast Alessio Chandra PA-C   1,000 mg at 10/23/24 0829    pantoprazole (PROTONIX) EC tablet 40 mg  40 mg Oral QAM AC Blaze Grewal MD   40 mg at 10/23/24 0829    sodium chloride (PF) 0.9% PF flush 3 mL  3 mL Intracatheter Q8H Blzae Grewal MD   3 mL at 10/23/24 0830       Infusion Meds  Current Facility-Administered Medications   Medication Dose Route Frequency Provider Last Rate Last Admin    Patient is already receiving mechanical prophylaxis   Does not apply Continuous PRN Alessio Chandra PA-C           Allergies   No Known Allergies       PHYSICAL EXAMINATION   Temp:  [97.4  F (36.3  C)-98.4  F (36.9  C)] 98  F (36.7  C)  Pulse:  [61-79] 69  Resp:  [11-44] 16  BP: (138-190)/() 166/88  SpO2:  [94 %-97 %] 94 %    General Exam  General:  patient lying in bed without any acute distress    HEENT:  normocephalic/atraumatic  Pulmonary:  no respiratory distress      Neuro Exam  Mental Status:  alert, oriented x 3, follows commands, speech clear and fluent, naming and repetition normal  Cranial Nerves:  visual fields intact (tested by nurse), EOMI with normal smooth pursuit, facial sensation intact and symmetric (tested by nurse), facial movements symmetric, hearing not formally tested but intact to conversation, no dysarthria, tongue protrusion midline  Motor:  no abnormal movements, able to move all limbs antigravity spontaneously with no signs of hemiparesis observed, no pronator drift   Reflexes:  unable to test (telestroke)  Sensory:  light touch sensation intact and symmetric throughout upper and lower extremities (assessed by nurse), no extinction on double simultaneous stimulation (assessed by nurse)  Coordination:  normal finger-to-nose and heel-to-shin bilaterally without dysmetria, rapid alternating movements symmetric  Station/Gait:  unable to test due to telestroke    Stroke Scales    NIHSS  1a. Level of Consciousness 0-->Alert,  "keenly responsive   1b. LOC Questions 0-->Answers both questions correctly   1c. LOC Commands 0-->Performs both tasks correctly   2.   Best Gaze 0-->Normal   3.   Visual 0-->No visual loss   4.   Facial Palsy 0-->Normal symmetrical movements   5a. Motor Arm, Left 0-->No drift, limb holds 90 (or 45) degrees for full 10 secs   5b. Motor Arm, Right 0-->No drift, limb holds 90 (or 45) degrees for full 10 secs   6a. Motor Leg, Left 0-->No drift, leg holds 30 degree position for full 5 secs   6b. Motor Leg, right 0-->No drift, leg holds 30 degree position for full 5 secs   7.   Limb Ataxia 0-->Absent   8.   Sensory 0-->Normal, no sensory loss   9.   Best Language 0-->No aphasia, normal   10. Dysarthria 0-->Normal   11. Extinction and Inattention  0-->No abnormality   Total 0 (10/23/24 1103)       Imaging  I personally reviewed all imaging; relevant findings per HPI.    Labs Data   CBC  Recent Labs   Lab 10/22/24  1401   WBC 6.7   RBC 4.57   HGB 13.5   HCT 41.2        Basic Metabolic Panel   Recent Labs   Lab 10/23/24  0802 10/23/24  0627 10/22/24  2209 10/22/24  1401   NA  --  139  --  141   POTASSIUM  --  4.4  --  4.8   CHLORIDE  --  107  --  107   CO2  --  24  --  28   BUN  --  10.4  --  11.3   CR  --  0.94  --  1.01*   * 129* 125* 107*   MARIA R  --  9.1  --  9.3     Liver Panel  No results for input(s): \"PROTTOTAL\", \"ALBUMIN\", \"BILITOTAL\", \"ALKPHOS\", \"AST\", \"ALT\", \"BILIDIRECT\" in the last 168 hours.  INR    Recent Labs   Lab Test 10/23/24  0627   INR 1.04           Stroke Consult Data Data   Telestroke Service Details  (for non-emergent stroke consult with tele)  Video start time 10/23/24   1100   Video end time 10/23/24   1138   Type of service telemedicine diagnostic assessment of acute neurological changes   Reason telemedicine is appropriate patient requires assessment with a specialist for diagnosis and treatment of neurological symptoms   Mode of transmission secure interactive audio and video " communication per Yamileth   Originating site (patient location) [facility]    Distant site (provider location) Provider remote site       I have personally spent a total of 65 minutes providing care today, time spent in reviewing medical records and devising the plan as recorded above.

## 2024-10-23 NOTE — PROGRESS NOTES
Speech Language Pathology: Orders received. Chart reviewed and discussed with patient and care team.? Speech Language Pathology not indicated due to no speech, langauge, or oropharyngeal swallowing deficits.? Will complete orders.

## 2024-10-24 ENCOUNTER — PATIENT OUTREACH (OUTPATIENT)
Dept: CARE COORDINATION | Facility: CLINIC | Age: 74
End: 2024-10-24
Payer: COMMERCIAL

## 2024-10-24 NOTE — PROGRESS NOTES
"  [facility] [place]: Transitions of Care Outreach  Chief Complaint   Patient presents with    Clinic Care Coordination - Post Hospital       Most Recent Admission Date: 10/22/2024   Most Recent Admission Diagnosis: Blurred vision - H53.8  Facial paresthesia - R20.2     Most Recent Discharge Date: 10/23/2024   Most Recent Discharge Diagnosis: Blurred vision - H53.8  Facial paresthesia - R20.2  TIA (transient ischemic attack) - G45.9     Transitions of Care Assessment    Discharge Assessment  How are you doing now that you are home?: \" I am doing fine \"  How are your symptoms? (Red Flag symptoms escalate to triage hotline per guidelines): Improved  Do you know how to contact your clinic care team if you have future questions or changes to your health status? : Yes  Does the patient have their discharge instructions? : Yes  Does the patient have questions regarding their discharge instructions? : No  Were you started on any new medications or were there changes to any of your previous medications? : Yes  Does the patient have all of their medications?: Yes  Do you have questions regarding any of your medications? : No  Do you have all of your needed medical supplies or equipment (DME)?  (i.e. oxygen tank, CPAP, cane, etc.): Yes    Post-op ([facility] CTA Only)  If the patient had a surgery or procedure, do they have any questions for a nurse?: No         [facility] Explained and offered Care Coordination support to eligible patients: Yes    Patient accepted? No    Follow up Plan     Discharge Follow-Up  Discharge follow up appointment scheduled in alignment with recommended follow up timeframe or Transitions of Risk Category? (Low = within 30 days; Moderate= within 14 days; High= within 7 days): Yes  Discharge Follow Up Appointment Date: 10/30/24  Discharge Follow Up Appointment Scheduled with?: Primary Care Provider    Future Appointments   Date Time Provider Department Center   10/30/2024  9:00 AM Idalia Gage " MD [facility]   10/2/2025 10:40 AM Idalia Gage MD [facility]       Outpatient Plan as outlined on AVS reviewed with patient.    For any urgent concerns, please contact our 24 hour nurse triage line: [phone] ([phone])       BIRDIE Hernandez

## 2024-10-30 ENCOUNTER — OFFICE VISIT (OUTPATIENT)
Dept: FAMILY MEDICINE | Facility: CLINIC | Age: 74
End: 2024-10-30
Payer: COMMERCIAL

## 2024-10-30 VITALS
HEART RATE: 80 BPM | DIASTOLIC BLOOD PRESSURE: 86 MMHG | WEIGHT: 163 LBS | BODY MASS INDEX: 26.2 KG/M2 | OXYGEN SATURATION: 98 % | TEMPERATURE: 96.5 F | SYSTOLIC BLOOD PRESSURE: 138 MMHG | HEIGHT: 66 IN | RESPIRATION RATE: 16 BRPM

## 2024-10-30 DIAGNOSIS — I10 BENIGN ESSENTIAL HYPERTENSION: ICD-10-CM

## 2024-10-30 DIAGNOSIS — Z78.0 ASYMPTOMATIC MENOPAUSAL STATE: ICD-10-CM

## 2024-10-30 DIAGNOSIS — G45.9 TIA (TRANSIENT ISCHEMIC ATTACK): Primary | ICD-10-CM

## 2024-10-30 DIAGNOSIS — E11.9 TYPE 2 DIABETES MELLITUS WITHOUT COMPLICATION, WITHOUT LONG-TERM CURRENT USE OF INSULIN (H): ICD-10-CM

## 2024-10-30 DIAGNOSIS — Z12.11 SCREEN FOR COLON CANCER: ICD-10-CM

## 2024-10-30 DIAGNOSIS — Z12.31 ENCOUNTER FOR SCREENING MAMMOGRAM FOR BREAST CANCER: ICD-10-CM

## 2024-10-30 PROCEDURE — 99495 TRANSJ CARE MGMT MOD F2F 14D: CPT | Performed by: FAMILY MEDICINE

## 2024-10-30 RX ORDER — LOSARTAN POTASSIUM 50 MG/1
50 TABLET ORAL DAILY
Qty: 100 TABLET | Refills: 4 | Status: SHIPPED | OUTPATIENT
Start: 2024-10-30

## 2024-10-30 RX ORDER — METFORMIN HYDROCHLORIDE 500 MG/1
TABLET, EXTENDED RELEASE ORAL
Qty: 200 TABLET | Refills: 4 | Status: SHIPPED | OUTPATIENT
Start: 2024-10-30

## 2024-10-30 ASSESSMENT — PAIN SCALES - GENERAL: PAINLEVEL_OUTOF10: NO PAIN (0)

## 2024-10-30 NOTE — NURSING NOTE
"Initial /86   Pulse 80   Temp (!) 96.5  F (35.8  C) (Tympanic)   Resp 16   Ht 1.676 m (5' 6\")   Wt 73.9 kg (163 lb)   SpO2 98%   BMI 26.31 kg/m   Estimated body mass index is 26.31 kg/m  as calculated from the following:    Height as of this encounter: 1.676 m (5' 6\").    Weight as of this encounter: 73.9 kg (163 lb). .    "

## 2024-10-30 NOTE — PROGRESS NOTES
"  Assessment & Plan     TIA (transient ischemic attack)  Was seen in the ER with some facial heaviness and tingling as well as dysarthria and blurred vision.  Resolved spontaneously.  Head CT, CTA, MRI showed no obvious structural lesion/stroke.  Neurology was consulted.  They felt likely related to hypertension.  Goal will be to optimize blood pressure control.  She is on aspirin 81 mg now.  Blood pressure suboptimally controlled today and also at home since ER visit so we will increase losartan to 50 mg daily.  She is to message me in 2 weeks with updated blood pressures.  Neurology also waiting on starting a statin.  Given LDL 85 they recommended holding off on statin for now.  Will continue to monitor lipids at least annually.    Type 2 diabetes mellitus without complication, without long-term current use of insulin (H)  Well controlled. Refilled medication.     - losartan (COZAAR) 50 MG tablet; Take 1 tablet (50 mg) by mouth daily.  - metFORMIN (GLUCOPHAGE XR) 500 MG 24 hr tablet; TAKE 2 TABLETS BY MOUTH ONCE DAILY WITH BREAKFAST    Benign essential hypertension  Well controlled. Refilled medication.     - losartan (COZAAR) 50 MG tablet; Take 1 tablet (50 mg) by mouth daily.    Encounter for screening mammogram for breast cancer  - MA Screening Bilateral w/ Jarrell; Future    Screen for colon cancer  - Colonoscopy Screening  Referral; Future    Asymptomatic menopausal state  - DEXA HIP/PELVIS/SPINE - Future; Future        MED REC REQUIRED  Post Medication Reconciliation Status:  Discharge medications reconciled and changed, see notes/orders  BMI  Estimated body mass index is 26.31 kg/m  as calculated from the following:    Height as of this encounter: 1.676 m (5' 6\").    Weight as of this encounter: 73.9 kg (163 lb).             Jony Matta is a 74 year old, presenting for the following health issues:  Hospital F/U        9/13/2024    10:35 AM   Additional Questions   Roomed by Gloria COATES CMA " "    [facility]        Hospital Follow-up Visit:    Hospital/Nursing Home/IP Rehab Facility: [facility]  Date of Admission: 10/22/2024  Date of Discharge: 10/23/2024  Reason(s) for Admission: Blurry Vision, Facial paresthesia  Was the patient in the ICU or did the patient experience delirium during hospitalization?  No  Do you have any other stressors you would like to discuss with your provider? No    Problems taking medications regularly:  None  Medication changes since discharge: None  Problems adhering to non-medication therapy:  None    Summary of hospitalization:  [facility] discharge summary reviewed  Diagnostic Tests/Treatments reviewed.  Follow up needed: As per A&P  Other Healthcare Providers Involved in Patient s Care:         Specialist appointment - neurology  Update since discharge: improved.         Plan of care communicated with patient         ROS:   Constitutional, neuro, ENT, endocrine, pulmonary, cardiac, gastrointestinal, genitourinary, musculoskeletal, integument and psychiatric systems are negative, except as otherwise noted.       Objective    /86   Pulse 80   Temp (!) 96.5  F (35.8  C) (Tympanic)   Resp 16   Ht 1.676 m (5' 6\")   Wt 73.9 kg (163 lb)   SpO2 98%   BMI 26.31 kg/m    Body mass index is 26.31 kg/m .  Physical Exam   GENERAL: Pleasant, well appearing female.  HEENT: PERRL, EOMI.  NECK: supple, no thyromegaly or thyroid masses, no lymphadenopathy.  CV: RRR, no murmurs, rubs or gallops. No carotid bruits.   LUNGS: Clear to auscultation bilaterally, normal effort.  SKIN: warm and dry without obvious rashes.   EXTREMITIES: No edema, normal pulses.     Lab Results   Component Value Date    A1C 6.8 09/13/2024    A1C 6.7 09/07/2023    A1C 6.9 08/10/2022    A1C 7.2 07/23/2021    A1C 7.1 12/07/2018    A1C 7.2 11/08/2017    A1C 6.4 06/13/2016    A1C 13.4 02/02/2016            Signed Electronically by: Idalia Gage MD    "

## 2024-10-30 NOTE — PATIENT INSTRUCTIONS
"Message me in 2 weeks with updated home blood pressures. Goal <130/80.    * * *  If you have a [facility] account results will appear in your MyChart.  If you do not have a MyChart account results will be mailed or called to you.    Lab and imaging results are released \"real time\" into My Chart.  This may mean that you see the results before I have a chance to review them. My Chart will alert you again when I review the results and enter comments.  Sometimes with imaging or labs there may be serious or unexpected results. Critical results are paged to me or the after hours on-call provider so that they can be reviewed immediately.  This is not true of non-critical abnormal results. Unfortunately, this means that it's possible you may be alerted of a serious finding before I have a chance to review it.  If you ever receive a result that you are concerned about and I have not already contacted you, please feel free to reach out to me or the care team so that you get the answers you need. You can call the care team at [phone] and say \"Care Team\".    Additionally, it is my goal that you understand the care plan discussed at your visit and that any questions you have are answered.  Please feel free to reach out if you need clarification or explanation of any information addressed at your office visit.      "

## 2024-11-06 ENCOUNTER — DOCUMENTATION ONLY (OUTPATIENT)
Dept: OTHER | Facility: CLINIC | Age: 74
End: 2024-11-06
Payer: COMMERCIAL

## 2024-11-12 ENCOUNTER — MYC MEDICAL ADVICE (OUTPATIENT)
Dept: FAMILY MEDICINE | Facility: CLINIC | Age: 74
End: 2024-11-12
Payer: COMMERCIAL

## 2024-11-12 DIAGNOSIS — I10 BENIGN ESSENTIAL HYPERTENSION: ICD-10-CM

## 2024-11-12 DIAGNOSIS — E11.9 TYPE 2 DIABETES MELLITUS WITHOUT COMPLICATION, WITHOUT LONG-TERM CURRENT USE OF INSULIN (H): ICD-10-CM

## 2024-11-13 NOTE — TELEPHONE ENCOUNTER
Blood pressure suboptimally controlled. Inc losartan to 100mg every day. Prescription pended please fax to pharmacy of patient's choice.  Follow-up for nurse blood pressure check and labs in 1 month.

## 2024-11-14 RX ORDER — LOSARTAN POTASSIUM 100 MG/1
100 TABLET ORAL DAILY
Qty: 90 TABLET | Refills: 4 | Status: SHIPPED | OUTPATIENT
Start: 2024-11-14 | End: 2024-11-15

## 2024-11-15 RX ORDER — LOSARTAN POTASSIUM 100 MG/1
100 TABLET ORAL DAILY
Qty: 90 TABLET | Refills: 4 | Status: SHIPPED | OUTPATIENT
Start: 2024-11-15

## 2024-11-19 LAB — CV ZIO PRELIM RESULTS: NORMAL

## 2025-03-29 ENCOUNTER — HEALTH MAINTENANCE LETTER (OUTPATIENT)
Age: 75
End: 2025-03-29

## 2025-05-21 ENCOUNTER — MYC MEDICAL ADVICE (OUTPATIENT)
Dept: FAMILY MEDICINE | Facility: CLINIC | Age: 75
End: 2025-05-21

## 2025-05-21 NOTE — TELEPHONE ENCOUNTER
Sent [employer] message to Sigrid DEBI Mcneill in regards to their metformin and losartan being overdue for refill. Per our records last filled 2/8/25 for 90 day supply and is 12 days late to refill.      Samira Spain, PharmD, [facility]  Population Health Pharmacist  [phone]

## 2025-05-30 NOTE — TELEPHONE ENCOUNTER
Attempted to reach Sigrid Mcneill in regards to their metformin and losartan being overdue for refill. Left voicemail, with call back number, requesting return call. Per our records last filled 2/8/25 for 90 day supply and is 21 days late to refill.      Samira Spain, PharmD, [facility]  Population Health Pharmacist  [phone]

## 2025-06-30 ENCOUNTER — PATIENT OUTREACH (OUTPATIENT)
Dept: CARE COORDINATION | Facility: CLINIC | Age: 75
End: 2025-06-30
Payer: COMMERCIAL

## 2025-06-30 NOTE — TELEPHONE ENCOUNTER
Spoke with Sigrid Mcneill today in regards to metformin and losartan. Both about 50 days late to refill. Per Sigrid, refilling them both today.     Thank you,    Autumn Olguin, PharmD, [facility]

## 2025-08-07 ENCOUNTER — PATIENT OUTREACH (OUTPATIENT)
Dept: FAMILY MEDICINE | Facility: CLINIC | Age: 75
End: 2025-08-07
Payer: COMMERCIAL